# Patient Record
Sex: MALE | Race: BLACK OR AFRICAN AMERICAN | Employment: OTHER
[De-identification: names, ages, dates, MRNs, and addresses within clinical notes are randomized per-mention and may not be internally consistent; named-entity substitution may affect disease eponyms.]

---

## 2022-03-20 PROBLEM — Z87.828 HISTORY OF CLOSED HIP DISLOCATION: Status: ACTIVE | Noted: 2020-11-23

## 2022-04-26 PROBLEM — G62.9 PERIPHERAL NEUROPATHY: Status: ACTIVE | Noted: 2020-10-23

## 2022-04-26 PROBLEM — I10 ESSENTIAL HYPERTENSION: Status: ACTIVE | Noted: 2020-10-23

## 2022-05-02 PROBLEM — I48.11 LONGSTANDING PERSISTENT ATRIAL FIBRILLATION (HCC): Status: ACTIVE | Noted: 2022-05-02

## 2022-12-07 PROBLEM — I50.21 ACUTE HFREF (HEART FAILURE WITH REDUCED EJECTION FRACTION) (HCC): Status: ACTIVE | Noted: 2022-12-07

## 2023-01-10 PROBLEM — N18.30 CHRONIC RENAL DISEASE, STAGE III (HCC): Status: ACTIVE | Noted: 2023-01-10

## 2023-01-27 PROBLEM — I48.19 PERSISTENT ATRIAL FIBRILLATION (HCC): Status: ACTIVE | Noted: 2023-01-27

## 2023-02-28 DIAGNOSIS — D50.9 IRON DEFICIENCY ANEMIA, UNSPECIFIED IRON DEFICIENCY ANEMIA TYPE: ICD-10-CM

## 2023-02-28 RX ORDER — CETIRIZINE HYDROCHLORIDE 10 MG/1
TABLET ORAL
Qty: 90 TABLET | Refills: 1 | OUTPATIENT
Start: 2023-02-28

## 2023-02-28 RX ORDER — IRON POLYSACCHARIDE COMPLEX 150 MG
CAPSULE ORAL
Qty: 90 CAPSULE | Refills: 0 | OUTPATIENT
Start: 2023-02-28

## 2023-03-24 DIAGNOSIS — D50.9 IRON DEFICIENCY ANEMIA, UNSPECIFIED IRON DEFICIENCY ANEMIA TYPE: ICD-10-CM

## 2023-03-24 DIAGNOSIS — L29.9 ITCHING: ICD-10-CM

## 2023-03-24 RX ORDER — HYDROXYZINE HYDROCHLORIDE 25 MG/1
TABLET, FILM COATED ORAL
Qty: 60 TABLET | Refills: 0 | OUTPATIENT
Start: 2023-03-24

## 2023-03-24 RX ORDER — IRON POLYSACCHARIDE COMPLEX 150 MG
CAPSULE ORAL
Qty: 90 CAPSULE | Refills: 0 | OUTPATIENT
Start: 2023-03-24

## 2023-03-27 ENCOUNTER — TELEPHONE (OUTPATIENT)
Dept: FAMILY MEDICINE CLINIC | Facility: CLINIC | Age: 63
End: 2023-03-27

## 2023-03-27 RX ORDER — GABAPENTIN 300 MG/1
300 CAPSULE ORAL DAILY
Qty: 30 CAPSULE | Refills: 0 | Status: SHIPPED | OUTPATIENT
Start: 2023-03-27 | End: 2023-04-26 | Stop reason: SDUPTHER

## 2023-04-25 NOTE — PROGRESS NOTES
Microalbumin / Creatinine Urine Ratio    Comprehensive Metabolic Panel    CBC with Auto Differential    Microalbumin / Creatinine Urine Ratio      2. Mixed hyperlipidemia  atorvastatin (LIPITOR) 20 MG tablet    CBC with Auto Differential    Comprehensive Metabolic Panel    Lipid Panel    Lipid Panel    Comprehensive Metabolic Panel    CBC with Auto Differential      3. Neuropathy  gabapentin (NEURONTIN) 300 MG capsule      4. Iron deficiency anemia, unspecified iron deficiency anemia type  Iron    iron polysaccharides (NIFEREX) 150 MG capsule    Iron      5. Screening for HIV (human immunodeficiency virus)  HIV 1/2 Ag/Ab, 4TH Generation,W Rflx Confirm    HIV 1/2 Ag/Ab, 4TH Generation,W Rflx Confirm        Checking blood work. If kidneys lower or not improved will send referral to Nephrologist.     Checking iron- refilled. Increased gabapentin from daily to BID for neuropathy-seeing podiatry. Anticipatory Guidance/Education:  Anticipatory guidance for age-seatbelts, avoid cell phone use in car (may use hands free), no texting while driving, avoid social drugs and tobacco, limit alcohol, fall safety, personal safety. Encourage healthy diet and exercise daily. Follow up 3 months.      GORAN Red - CNP

## 2023-04-26 ENCOUNTER — OFFICE VISIT (OUTPATIENT)
Dept: FAMILY MEDICINE CLINIC | Facility: CLINIC | Age: 63
End: 2023-04-26
Payer: MEDICARE

## 2023-04-26 VITALS
DIASTOLIC BLOOD PRESSURE: 82 MMHG | TEMPERATURE: 97.2 F | BODY MASS INDEX: 28.11 KG/M2 | WEIGHT: 219 LBS | HEART RATE: 67 BPM | RESPIRATION RATE: 16 BRPM | HEIGHT: 74 IN | SYSTOLIC BLOOD PRESSURE: 140 MMHG | OXYGEN SATURATION: 97 %

## 2023-04-26 DIAGNOSIS — Z11.4 SCREENING FOR HIV (HUMAN IMMUNODEFICIENCY VIRUS): ICD-10-CM

## 2023-04-26 DIAGNOSIS — E78.2 MIXED HYPERLIPIDEMIA: ICD-10-CM

## 2023-04-26 DIAGNOSIS — N28.9 FUNCTION KIDNEY DECREASED: Primary | ICD-10-CM

## 2023-04-26 DIAGNOSIS — G62.9 NEUROPATHY: ICD-10-CM

## 2023-04-26 DIAGNOSIS — D50.9 IRON DEFICIENCY ANEMIA, UNSPECIFIED IRON DEFICIENCY ANEMIA TYPE: ICD-10-CM

## 2023-04-26 LAB
BASOPHILS # BLD: 0.1 K/UL (ref 0–0.2)
BASOPHILS NFR BLD: 1 % (ref 0–2)
DIFFERENTIAL METHOD BLD: ABNORMAL
EOSINOPHIL # BLD: 0.3 K/UL (ref 0–0.8)
EOSINOPHIL NFR BLD: 4 % (ref 0.5–7.8)
ERYTHROCYTE [DISTWIDTH] IN BLOOD BY AUTOMATED COUNT: 13.5 % (ref 11.9–14.6)
HCT VFR BLD AUTO: 34.5 % (ref 41.1–50.3)
HGB BLD-MCNC: 11.3 G/DL (ref 13.6–17.2)
IMM GRANULOCYTES # BLD AUTO: 0 K/UL (ref 0–0.5)
IMM GRANULOCYTES NFR BLD AUTO: 1 % (ref 0–5)
LYMPHOCYTES # BLD: 1.6 K/UL (ref 0.5–4.6)
LYMPHOCYTES NFR BLD: 27 % (ref 13–44)
MCH RBC QN AUTO: 32.9 PG (ref 26.1–32.9)
MCHC RBC AUTO-ENTMCNC: 32.8 G/DL (ref 31.4–35)
MCV RBC AUTO: 100.6 FL (ref 82–102)
MONOCYTES # BLD: 0.4 K/UL (ref 0.1–1.3)
MONOCYTES NFR BLD: 6 % (ref 4–12)
NEUTS SEG # BLD: 3.7 K/UL (ref 1.7–8.2)
NEUTS SEG NFR BLD: 61 % (ref 43–78)
NRBC # BLD: 0 K/UL (ref 0–0.2)
PLATELET # BLD AUTO: 370 K/UL (ref 150–450)
PMV BLD AUTO: 10.1 FL (ref 9.4–12.3)
RBC # BLD AUTO: 3.43 M/UL (ref 4.23–5.6)
WBC # BLD AUTO: 6 K/UL (ref 4.3–11.1)

## 2023-04-26 PROCEDURE — 3017F COLORECTAL CA SCREEN DOC REV: CPT | Performed by: NURSE PRACTITIONER

## 2023-04-26 PROCEDURE — 3077F SYST BP >= 140 MM HG: CPT | Performed by: NURSE PRACTITIONER

## 2023-04-26 PROCEDURE — G8419 CALC BMI OUT NRM PARAM NOF/U: HCPCS | Performed by: NURSE PRACTITIONER

## 2023-04-26 PROCEDURE — 4004F PT TOBACCO SCREEN RCVD TLK: CPT | Performed by: NURSE PRACTITIONER

## 2023-04-26 PROCEDURE — G8427 DOCREV CUR MEDS BY ELIG CLIN: HCPCS | Performed by: NURSE PRACTITIONER

## 2023-04-26 PROCEDURE — 3079F DIAST BP 80-89 MM HG: CPT | Performed by: NURSE PRACTITIONER

## 2023-04-26 PROCEDURE — 99214 OFFICE O/P EST MOD 30 MIN: CPT | Performed by: NURSE PRACTITIONER

## 2023-04-26 RX ORDER — ATORVASTATIN CALCIUM 20 MG/1
20 TABLET, FILM COATED ORAL DAILY
Qty: 90 TABLET | Refills: 1 | Status: SHIPPED | OUTPATIENT
Start: 2023-04-26

## 2023-04-26 RX ORDER — KETOCONAZOLE 20 MG/G
CREAM TOPICAL DAILY
COMMUNITY

## 2023-04-26 RX ORDER — IRON POLYSACCHARIDE COMPLEX 150 MG
150 CAPSULE ORAL DAILY
Qty: 90 CAPSULE | Refills: 0 | Status: SHIPPED | OUTPATIENT
Start: 2023-04-26 | End: 2023-07-25

## 2023-04-26 RX ORDER — TERBINAFINE HYDROCHLORIDE 250 MG/1
250 TABLET ORAL DAILY
COMMUNITY

## 2023-04-26 RX ORDER — GABAPENTIN 300 MG/1
300 CAPSULE ORAL 2 TIMES DAILY
Qty: 180 CAPSULE | Refills: 1 | Status: SHIPPED | OUTPATIENT
Start: 2023-04-26 | End: 2023-10-23

## 2023-04-26 ASSESSMENT — PATIENT HEALTH QUESTIONNAIRE - PHQ9
2. FEELING DOWN, DEPRESSED OR HOPELESS: 0
SUM OF ALL RESPONSES TO PHQ QUESTIONS 1-9: 0
1. LITTLE INTEREST OR PLEASURE IN DOING THINGS: 0
SUM OF ALL RESPONSES TO PHQ9 QUESTIONS 1 & 2: 0
SUM OF ALL RESPONSES TO PHQ QUESTIONS 1-9: 0

## 2023-04-27 LAB
ALBUMIN SERPL-MCNC: 3.4 G/DL (ref 3.2–4.6)
ALBUMIN/GLOB SERPL: 0.7 (ref 0.4–1.6)
ALP SERPL-CCNC: 69 U/L (ref 50–136)
ALT SERPL-CCNC: 14 U/L (ref 12–65)
ANION GAP SERPL CALC-SCNC: 5 MMOL/L (ref 2–11)
AST SERPL-CCNC: 23 U/L (ref 15–37)
BILIRUB SERPL-MCNC: 0.3 MG/DL (ref 0.2–1.1)
BUN SERPL-MCNC: 17 MG/DL (ref 8–23)
CALCIUM SERPL-MCNC: 9 MG/DL (ref 8.3–10.4)
CHLORIDE SERPL-SCNC: 113 MMOL/L (ref 101–110)
CHOLEST SERPL-MCNC: 212 MG/DL
CO2 SERPL-SCNC: 24 MMOL/L (ref 21–32)
CREAT SERPL-MCNC: 1.4 MG/DL (ref 0.8–1.5)
CREAT UR-MCNC: 162 MG/DL
GLOBULIN SER CALC-MCNC: 4.7 G/DL (ref 2.8–4.5)
GLUCOSE SERPL-MCNC: 86 MG/DL (ref 65–100)
HDLC SERPL-MCNC: 60 MG/DL (ref 40–60)
HDLC SERPL: 3.5
HIV 1+2 AB+HIV1 P24 AG SERPL QL IA: NONREACTIVE
HIV 1/2 RESULT COMMENT: NORMAL
IRON SERPL-MCNC: 92 UG/DL (ref 35–150)
LDLC SERPL CALC-MCNC: 124.2 MG/DL
MICROALBUMIN UR-MCNC: 1.86 MG/DL (ref 0–3)
MICROALBUMIN/CREAT UR-RTO: 11 MG/G (ref 0–30)
POTASSIUM SERPL-SCNC: 3.8 MMOL/L (ref 3.5–5.1)
PROT SERPL-MCNC: 8.1 G/DL (ref 6.3–8.2)
SODIUM SERPL-SCNC: 142 MMOL/L (ref 133–143)
TRIGL SERPL-MCNC: 139 MG/DL (ref 35–150)
VLDLC SERPL CALC-MCNC: 27.8 MG/DL (ref 6–23)

## 2023-07-01 DIAGNOSIS — L29.9 ITCHING: ICD-10-CM

## 2023-07-03 RX ORDER — HYDROXYZINE HYDROCHLORIDE 25 MG/1
TABLET, FILM COATED ORAL
Qty: 180 TABLET | Refills: 0 | OUTPATIENT
Start: 2023-07-03

## 2023-07-04 DIAGNOSIS — D50.9 IRON DEFICIENCY ANEMIA, UNSPECIFIED IRON DEFICIENCY ANEMIA TYPE: ICD-10-CM

## 2023-07-05 RX ORDER — IRON POLYSACCHARIDE COMPLEX 150 MG
CAPSULE ORAL
Qty: 90 CAPSULE | Refills: 0 | OUTPATIENT
Start: 2023-07-05

## 2023-07-25 NOTE — PROGRESS NOTES
James Adan (: 1960) presents today for follow up. He currently only sees PCP, dermatology. He has past history of high cholesterol, high blood pressure, atrial fibrillation. Blood work checked last 2023 showing all normal except for elevated cholesterol, decrease kidney function, abnormal CBC. Cholesterol   Total chol 212, . On baby aspirin, 20 mg lipitor. Kidneys  Kidneys- GFR 56- was 39. Has not seen nephrology in the past.     Iron  Thinks someone in the hospital originally prescribed him this. hemoglobin trend 11.3-12-12.1-10.4. GERD- on 20 mg nexium- controls symptoms. Hypertension-   On diovan 80 mg, metoprolol 200 mg XL. When he checks his bp it is always around 140/90- states it is never above this. He is on xarelto for atrial fibrillation. He has not seen cardiology since 2023- appears he had an apt but missed this. Was seeing urology for follow up on elevated psa- is not seeing them anymore. Stopped going to [facility] center- were not helping his pain. Was going there for neuropathy. On gabapentin for neuropathy. Did see a specialist for his feet and skin concern- was given cream and has helped some. He is also on terbinafine pill right now as well.  Seeing dermatologist.        Chief Complaint   Patient presents with    Follow-up     Follow up BP and cholesterol     Patient Active Problem List   Diagnosis    History of bilateral hip replacements    History of closed hip dislocation    Essential hypertension    Mixed hyperlipidemia    Peripheral neuropathy    Tobacco use    Longstanding persistent atrial fibrillation (HCC)    Iatrogenic hypotension    Acute HFrEF (heart failure with reduced ejection fraction) (HCC)    Chronic renal disease, stage III ([address]) [146709]    Persistent atrial fibrillation ([address])     Past Medical History:   Diagnosis Date    Arthritis     Avascular necrosis

## 2023-07-26 ENCOUNTER — OFFICE VISIT (OUTPATIENT)
Dept: FAMILY MEDICINE CLINIC | Facility: CLINIC | Age: 63
End: 2023-07-26
Payer: MEDICARE

## 2023-07-26 VITALS
SYSTOLIC BLOOD PRESSURE: 140 MMHG | RESPIRATION RATE: 16 BRPM | DIASTOLIC BLOOD PRESSURE: 90 MMHG | WEIGHT: 217 LBS | HEART RATE: 57 BPM | OXYGEN SATURATION: 97 % | BODY MASS INDEX: 27.85 KG/M2 | HEIGHT: 74 IN | TEMPERATURE: 97.2 F

## 2023-07-26 DIAGNOSIS — D50.9 IRON DEFICIENCY ANEMIA, UNSPECIFIED IRON DEFICIENCY ANEMIA TYPE: ICD-10-CM

## 2023-07-26 DIAGNOSIS — N28.9 FUNCTION KIDNEY DECREASED: ICD-10-CM

## 2023-07-26 DIAGNOSIS — E78.2 MIXED HYPERLIPIDEMIA: Primary | ICD-10-CM

## 2023-07-26 LAB
ALBUMIN SERPL-MCNC: 3 G/DL (ref 3.2–4.6)
ALBUMIN/GLOB SERPL: 0.6 (ref 0.4–1.6)
ALP SERPL-CCNC: 70 U/L (ref 50–136)
ALT SERPL-CCNC: 16 U/L (ref 12–65)
ANION GAP SERPL CALC-SCNC: 5 MMOL/L (ref 2–11)
AST SERPL-CCNC: 19 U/L (ref 15–37)
BASOPHILS # BLD: 0.1 K/UL (ref 0–0.2)
BASOPHILS NFR BLD: 1 % (ref 0–2)
BILIRUB SERPL-MCNC: 0.4 MG/DL (ref 0.2–1.1)
BUN SERPL-MCNC: 15 MG/DL (ref 8–23)
CALCIUM SERPL-MCNC: 9.1 MG/DL (ref 8.3–10.4)
CHLORIDE SERPL-SCNC: 109 MMOL/L (ref 101–110)
CHOLEST SERPL-MCNC: 124 MG/DL
CO2 SERPL-SCNC: 28 MMOL/L (ref 21–32)
CREAT SERPL-MCNC: 1.6 MG/DL (ref 0.8–1.5)
DIFFERENTIAL METHOD BLD: ABNORMAL
EOSINOPHIL # BLD: 0.2 K/UL (ref 0–0.8)
EOSINOPHIL NFR BLD: 4 % (ref 0.5–7.8)
ERYTHROCYTE [DISTWIDTH] IN BLOOD BY AUTOMATED COUNT: 13.8 % (ref 11.9–14.6)
GLOBULIN SER CALC-MCNC: 4.9 G/DL (ref 2.8–4.5)
GLUCOSE SERPL-MCNC: 101 MG/DL (ref 65–100)
HCT VFR BLD AUTO: 35.8 % (ref 41.1–50.3)
HDLC SERPL-MCNC: 54 MG/DL (ref 40–60)
HDLC SERPL: 2.3
HGB BLD-MCNC: 11.3 G/DL (ref 13.6–17.2)
IMM GRANULOCYTES # BLD AUTO: 0 K/UL (ref 0–0.5)
IMM GRANULOCYTES NFR BLD AUTO: 0 % (ref 0–5)
LDLC SERPL CALC-MCNC: 52.4 MG/DL
LYMPHOCYTES # BLD: 1.4 K/UL (ref 0.5–4.6)
LYMPHOCYTES NFR BLD: 23 % (ref 13–44)
MCH RBC QN AUTO: 31 PG (ref 26.1–32.9)
MCHC RBC AUTO-ENTMCNC: 31.6 G/DL (ref 31.4–35)
MCV RBC AUTO: 98.1 FL (ref 82–102)
MONOCYTES # BLD: 0.6 K/UL (ref 0.1–1.3)
MONOCYTES NFR BLD: 10 % (ref 4–12)
NEUTS SEG # BLD: 3.8 K/UL (ref 1.7–8.2)
NEUTS SEG NFR BLD: 62 % (ref 43–78)
NRBC # BLD: 0 K/UL (ref 0–0.2)
PLATELET # BLD AUTO: 271 K/UL (ref 150–450)
PMV BLD AUTO: 10.3 FL (ref 9.4–12.3)
POTASSIUM SERPL-SCNC: 3.8 MMOL/L (ref 3.5–5.1)
PROT SERPL-MCNC: 7.9 G/DL (ref 6.3–8.2)
RBC # BLD AUTO: 3.65 M/UL (ref 4.23–5.6)
SODIUM SERPL-SCNC: 142 MMOL/L (ref 133–143)
TRIGL SERPL-MCNC: 88 MG/DL (ref 35–150)
VLDLC SERPL CALC-MCNC: 17.6 MG/DL (ref 6–23)
WBC # BLD AUTO: 6.1 K/UL (ref 4.3–11.1)

## 2023-07-26 PROCEDURE — 3080F DIAST BP >= 90 MM HG: CPT | Performed by: NURSE PRACTITIONER

## 2023-07-26 PROCEDURE — 99213 OFFICE O/P EST LOW 20 MIN: CPT | Performed by: NURSE PRACTITIONER

## 2023-07-26 PROCEDURE — 3017F COLORECTAL CA SCREEN DOC REV: CPT | Performed by: NURSE PRACTITIONER

## 2023-07-26 PROCEDURE — G8419 CALC BMI OUT NRM PARAM NOF/U: HCPCS | Performed by: NURSE PRACTITIONER

## 2023-07-26 PROCEDURE — 4004F PT TOBACCO SCREEN RCVD TLK: CPT | Performed by: NURSE PRACTITIONER

## 2023-07-26 PROCEDURE — G8427 DOCREV CUR MEDS BY ELIG CLIN: HCPCS | Performed by: NURSE PRACTITIONER

## 2023-07-26 PROCEDURE — 3077F SYST BP >= 140 MM HG: CPT | Performed by: NURSE PRACTITIONER

## 2023-07-26 SDOH — ECONOMIC STABILITY: FOOD INSECURITY: WITHIN THE PAST 12 MONTHS, YOU WORRIED THAT YOUR FOOD WOULD RUN OUT BEFORE YOU GOT MONEY TO BUY MORE.: NEVER TRUE

## 2023-07-26 SDOH — ECONOMIC STABILITY: INCOME INSECURITY: HOW HARD IS IT FOR YOU TO PAY FOR THE VERY BASICS LIKE FOOD, HOUSING, MEDICAL CARE, AND HEATING?: NOT HARD AT ALL

## 2023-07-26 SDOH — ECONOMIC STABILITY: FOOD INSECURITY: WITHIN THE PAST 12 MONTHS, THE FOOD YOU BOUGHT JUST DIDN'T LAST AND YOU DIDN'T HAVE MONEY TO GET MORE.: NEVER TRUE

## 2023-07-26 SDOH — ECONOMIC STABILITY: HOUSING INSECURITY
IN THE LAST 12 MONTHS, WAS THERE A TIME WHEN YOU DID NOT HAVE A STEADY PLACE TO SLEEP OR SLEPT IN A SHELTER (INCLUDING NOW)?: NO

## 2023-07-26 ASSESSMENT — PATIENT HEALTH QUESTIONNAIRE - PHQ9
SUM OF ALL RESPONSES TO PHQ QUESTIONS 1-9: 0
SUM OF ALL RESPONSES TO PHQ9 QUESTIONS 1 & 2: 0
2. FEELING DOWN, DEPRESSED OR HOPELESS: 0
1. LITTLE INTEREST OR PLEASURE IN DOING THINGS: 0
SUM OF ALL RESPONSES TO PHQ QUESTIONS 1-9: 0

## 2023-07-28 DIAGNOSIS — D50.9 IRON DEFICIENCY ANEMIA, UNSPECIFIED IRON DEFICIENCY ANEMIA TYPE: ICD-10-CM

## 2023-07-31 RX ORDER — IRON POLYSACCHARIDE COMPLEX 150 MG
CAPSULE ORAL
Qty: 90 CAPSULE | Refills: 0 | OUTPATIENT
Start: 2023-07-31

## 2023-08-14 RX ORDER — RIVAROXABAN 20 MG/1
TABLET, FILM COATED ORAL
Qty: 30 TABLET | Refills: 5 | Status: SHIPPED | OUTPATIENT
Start: 2023-08-14

## 2023-08-24 NOTE — PROGRESS NOTES
Medicare Annual Wellness Visit    Elenita Fernandez is here for Medicare AWV    Assessment & Plan   Medicare annual wellness visit, subsequent  -     CT Lung Screening; Future  -     [facility] - Referral to [facility] Clinical Specialist  Screen for colon cancer  -     [facility] - Colonoscopy  Need for vaccination  -     Tdap (ADACEL) 5-2-15.5 LF-MCG/0.5 injection; Inject 0.5 mLs into the muscle once for 1 dose, Disp-0.5 mL, R-0Normal  Pre-diabetes  -     Hemoglobin A1C; Future  Elevated PSA  -     PSA, Total and Free; Future  Unknown varicella vaccination status  -     Varicella Zoster Antibody, IgG; Future  Personal history of tobacco use  -     CT Lung Screening; Future  ACP (advance care planning)  -     [facility] - Referral to [facility] [address] Clinical Specialist  Mixed hyperlipidemia  -     atorvastatin (LIPITOR) 20 MG tablet; Take 1 tablet by mouth daily, Disp-90 tablet, R-1Normal    Ordered colonoscopy, CT lung screen, blood work, ACP. See sheet scanned into epic. Recommendations for Preventive Services Due: see orders and patient instructions/AVS.  Recommended screening schedule for the next 5-10 years is provided to the patient in written form: see Patient Instructions/AVS.          Subjective   For AMW. He has past history of high cholesterol, high blood pressure, atrial fibrillation. Blood work checked last 7/26 showing all normal except for kidneys, CBC.      GFR 48- was 56. CBC- continue iron. Hgb 11-11-12. Cholesterol normal.      Been over 10 years since last tdap- ordering today. Thinks 15 years he had a colonoscopy at [facility]- went up to [facility]- went there- gave him a numbing medication- used machine at that time. Wanted him to come back again- this was during pandemic- wanted to wait during that time. Will order today. Has never had lung cancer screening- history of tobacco use. Would like this. Not seeing urology for elevated psa.  Does not think he has seen one in the past. Does not

## 2023-08-25 ENCOUNTER — OFFICE VISIT (OUTPATIENT)
Dept: FAMILY MEDICINE CLINIC | Facility: CLINIC | Age: 63
End: 2023-08-25

## 2023-08-25 VITALS
SYSTOLIC BLOOD PRESSURE: 140 MMHG | BODY MASS INDEX: 29.26 KG/M2 | HEIGHT: 74 IN | RESPIRATION RATE: 16 BRPM | DIASTOLIC BLOOD PRESSURE: 90 MMHG | OXYGEN SATURATION: 97 % | WEIGHT: 228 LBS | TEMPERATURE: 97.3 F | HEART RATE: 57 BPM

## 2023-08-25 DIAGNOSIS — Z00.00 MEDICARE ANNUAL WELLNESS VISIT, SUBSEQUENT: Primary | ICD-10-CM

## 2023-08-25 DIAGNOSIS — Z71.89 ACP (ADVANCE CARE PLANNING): ICD-10-CM

## 2023-08-25 DIAGNOSIS — Z78.9 UNKNOWN VARICELLA VACCINATION STATUS: ICD-10-CM

## 2023-08-25 DIAGNOSIS — E78.2 MIXED HYPERLIPIDEMIA: ICD-10-CM

## 2023-08-25 DIAGNOSIS — R73.03 PRE-DIABETES: ICD-10-CM

## 2023-08-25 DIAGNOSIS — Z87.891 PERSONAL HISTORY OF TOBACCO USE: ICD-10-CM

## 2023-08-25 DIAGNOSIS — R97.20 ELEVATED PSA: ICD-10-CM

## 2023-08-25 DIAGNOSIS — Z12.11 SCREEN FOR COLON CANCER: ICD-10-CM

## 2023-08-25 DIAGNOSIS — Z23 NEED FOR VACCINATION: ICD-10-CM

## 2023-08-25 RX ORDER — ATORVASTATIN CALCIUM 20 MG/1
20 TABLET, FILM COATED ORAL DAILY
Qty: 90 TABLET | Refills: 1 | Status: SHIPPED | OUTPATIENT
Start: 2023-08-25

## 2023-08-25 SDOH — ECONOMIC STABILITY: FOOD INSECURITY: WITHIN THE PAST 12 MONTHS, THE FOOD YOU BOUGHT JUST DIDN'T LAST AND YOU DIDN'T HAVE MONEY TO GET MORE.: NEVER TRUE

## 2023-08-25 SDOH — ECONOMIC STABILITY: INCOME INSECURITY: HOW HARD IS IT FOR YOU TO PAY FOR THE VERY BASICS LIKE FOOD, HOUSING, MEDICAL CARE, AND HEATING?: NOT HARD AT ALL

## 2023-08-25 SDOH — ECONOMIC STABILITY: FOOD INSECURITY: WITHIN THE PAST 12 MONTHS, YOU WORRIED THAT YOUR FOOD WOULD RUN OUT BEFORE YOU GOT MONEY TO BUY MORE.: NEVER TRUE

## 2023-08-25 ASSESSMENT — PATIENT HEALTH QUESTIONNAIRE - PHQ9
1. LITTLE INTEREST OR PLEASURE IN DOING THINGS: 0
SUM OF ALL RESPONSES TO PHQ QUESTIONS 1-9: 0
SUM OF ALL RESPONSES TO PHQ9 QUESTIONS 1 & 2: 0
SUM OF ALL RESPONSES TO PHQ QUESTIONS 1-9: 0
2. FEELING DOWN, DEPRESSED OR HOPELESS: 0

## 2023-08-26 LAB
EST. AVERAGE GLUCOSE BLD GHB EST-MCNC: 114 MG/DL
HBA1C MFR BLD: 5.6 % (ref 4.8–5.6)
PSA FREE MFR SERPL: 40 %
PSA FREE SERPL-MCNC: 1 NG/ML
PSA SERPL-MCNC: 2.5 NG/ML

## 2023-08-27 LAB — VZV IGG SER IA-ACNC: 1164 INDEX

## 2023-08-28 ENCOUNTER — CLINICAL DOCUMENTATION (OUTPATIENT)
Dept: CASE MANAGEMENT | Age: 63
End: 2023-08-28

## 2023-08-28 NOTE — ACP (ADVANCE CARE PLANNING)
Advance Care Planning   Ambulatory ACP Specialist Patient Outreach    Date:  8/28/2023; 8/30/2023    ACP Specialist:  PHAN Cano    Outreach call to patient in follow-up to ACP Specialist referral from:GORAN Coker CNP    [x] PCP  [] Provider   [] Ambulatory Care Management [] Other     For:                  [x] Advance Directive Assistance              [] Complete Portable DNR order              [] Complete POST/POLST/MOST              [] Code Status Discussion             [] Discuss Goals of Care             [] Early ACP Decision-Making              [] Other (Specify)    Date Referral Received:8/25/2023    Next Step:   [] ACP scheduled conversation  [x] Close if don't hear in two weeks            [x] Email / Mail [address]  [x] Email / Mail Advance Directive   [] Closing referral.  Routing closure to referring provider/staff and to ACP Specialist . [] Closure letter mailed to patient with invitation to contact ACP Specialist if / when ready. [] Other (Specify here):         [x] At this time, Healthcare Decision Maker Is:  Advance Care Planning   Healthcare Decision Maker:    Primary Decision Maker: Winsome Mc - Child - [phone]        [] Primary agent named in scanned advance directive. [x] Legal Next of Kin. [] Unable to determine legal decision maker at this time. Outreaches:       [] 1st -  Date:  8/28/2023               Intervention:  [] Spoke with Patient   [x] Left Voice mail [] Email / Mail    [] Neult  [] Other [id number] : Outcomes:SW called to try and touch base with pt due to recent ACP referral.  However, SW got pt's voice mail and had to leave a message.    SW will attempt to follow up with pt again in a week if SW doesn't hear from pt before then.             [] 2nd -  Date:  8/30/2023               Intervention:  [x] Spoke with Patient  [] Left Voice mail [] Email / Mail    [] Neult  [] Other [id number] :

## 2023-08-30 ENCOUNTER — CLINICAL DOCUMENTATION (OUTPATIENT)
Dept: CASE MANAGEMENT | Age: 63
End: 2023-08-30

## 2023-09-06 ENCOUNTER — HOSPITAL ENCOUNTER (OUTPATIENT)
Dept: CT IMAGING | Age: 63
Discharge: HOME | End: 2023-09-08
Payer: MEDICARE

## 2023-09-06 DIAGNOSIS — Z00.00 MEDICARE ANNUAL WELLNESS VISIT, SUBSEQUENT: ICD-10-CM

## 2023-09-06 DIAGNOSIS — Z87.891 PERSONAL HISTORY OF TOBACCO USE: ICD-10-CM

## 2023-09-06 PROCEDURE — 71271 CT THORAX LUNG CANCER SCR C-: CPT

## 2023-09-14 ENCOUNTER — CLINICAL DOCUMENTATION (OUTPATIENT)
Dept: CASE MANAGEMENT | Age: 63
End: 2023-09-14

## 2023-09-29 NOTE — PROGRESS NOTES
[facility] CARDIOLOGY  [address] [facility] [address] [facility]  PHONE: [phone]      10/02/23    NAME:  Didi Alexandra  : 1960  MRN: [id number]         SUBJECTIVE:   Didi Alexandra is a 58 y.o. male seen for a follow up visit regarding the following:     Chief Complaint   Patient presents with    Follow-up    Atrial Fibrillation    Congestive Heart Failure            HPI:  Follow up  Follow-up, Atrial Fibrillation, and Congestive Heart Failure   . Follow up afib, tobacco use,  intermittent compliance, HFrEF, possibly tachy mediated. He was last seen in January and arranged EMILY guided DCCV with outpatient EP evaluation, didn't show for office follow up or EP Consultation, EF had recovered , though on personal review of images I'm not sure I fully agree. Clearly better than it was but not normal.        He feels much better, much less short of breath, no chest pain and no edema. Still smoking 1/2 ppd, says he wants to quit, notes he smokes mostly out of a feeling of boredom. PAST CARDIAC HISTORY:  Oct 2020- Echo [facility]) - EF 55-65%, impaired relaxation, aortic root 3.9, no significant valve disease, RVSP 19  May 2022- NST - fixed inferior defect, no ischemia. Not gated d/t afib  Echo - no show  2022       Echo - EF 35-40%, global HK, mild RVE, RVSP 46 SOTERO       Ambulatory monitor-persistent RVR, rates to 180  Dec 2022       LHC - minimal CAD, no V-gram  2023       EMILY - EF 50-55%, successful cardioversion                     Key CAD CHF Meds            atorvastatin (LIPITOR) 20 MG tablet (Taking)    Sig - Route: Take 1 tablet by mouth daily - Oral    XARELTO 20 MG TABS tablet (Taking)    Sig: TAKE 1 TABLET BY MOUTH EVERY DAY WITH DINNER    valsartan (DIOVAN) 80 MG tablet (Taking)    Sig - Route: Take 1 tablet by mouth daily - Oral    metoprolol succinate (TOPROL XL) 200 MG extended release tablet (Taking)    Sig - Route:  Take 1 tablet by mouth daily - Oral

## 2023-10-02 ENCOUNTER — OFFICE VISIT (OUTPATIENT)
Age: 63
End: 2023-10-02
Payer: MEDICARE

## 2023-10-02 VITALS
WEIGHT: 218.8 LBS | BODY MASS INDEX: 28.08 KG/M2 | HEART RATE: 60 BPM | SYSTOLIC BLOOD PRESSURE: 132 MMHG | HEIGHT: 74 IN | DIASTOLIC BLOOD PRESSURE: 86 MMHG

## 2023-10-02 DIAGNOSIS — I48.0 PAROXYSMAL ATRIAL FIBRILLATION (HCC): ICD-10-CM

## 2023-10-02 DIAGNOSIS — E78.2 MIXED HYPERLIPIDEMIA: ICD-10-CM

## 2023-10-02 DIAGNOSIS — R00.0 TACHYCARDIA INDUCED CARDIOMYOPATHY (HCC): Primary | ICD-10-CM

## 2023-10-02 DIAGNOSIS — I10 ESSENTIAL HYPERTENSION: ICD-10-CM

## 2023-10-02 DIAGNOSIS — N18.32 STAGE 3B CHRONIC KIDNEY DISEASE (HCC): ICD-10-CM

## 2023-10-02 DIAGNOSIS — I43 TACHYCARDIA INDUCED CARDIOMYOPATHY (HCC): Primary | ICD-10-CM

## 2023-10-02 PROCEDURE — 3075F SYST BP GE 130 - 139MM HG: CPT | Performed by: INTERNAL MEDICINE

## 2023-10-02 PROCEDURE — 93000 ELECTROCARDIOGRAM COMPLETE: CPT | Performed by: INTERNAL MEDICINE

## 2023-10-02 PROCEDURE — 99214 OFFICE O/P EST MOD 30 MIN: CPT | Performed by: INTERNAL MEDICINE

## 2023-10-02 PROCEDURE — 3079F DIAST BP 80-89 MM HG: CPT | Performed by: INTERNAL MEDICINE

## 2023-10-02 ASSESSMENT — ENCOUNTER SYMPTOMS: SHORTNESS OF BREATH: 1

## 2023-10-31 ENCOUNTER — OFFICE VISIT (OUTPATIENT)
Dept: FAMILY MEDICINE CLINIC | Facility: CLINIC | Age: 63
End: 2023-10-31
Payer: MEDICARE

## 2023-10-31 VITALS
OXYGEN SATURATION: 98 % | RESPIRATION RATE: 16 BRPM | HEIGHT: 74 IN | WEIGHT: 227 LBS | SYSTOLIC BLOOD PRESSURE: 170 MMHG | TEMPERATURE: 97.3 F | DIASTOLIC BLOOD PRESSURE: 98 MMHG | BODY MASS INDEX: 29.13 KG/M2 | HEART RATE: 61 BPM

## 2023-10-31 DIAGNOSIS — Z23 NEED FOR VACCINATION: ICD-10-CM

## 2023-10-31 DIAGNOSIS — G47.00 INSOMNIA, UNSPECIFIED TYPE: ICD-10-CM

## 2023-10-31 DIAGNOSIS — J30.1 ALLERGIC RHINITIS DUE TO POLLEN, UNSPECIFIED SEASONALITY: ICD-10-CM

## 2023-10-31 DIAGNOSIS — E78.2 MIXED HYPERLIPIDEMIA: Primary | ICD-10-CM

## 2023-10-31 DIAGNOSIS — N28.9 FUNCTION KIDNEY DECREASED: ICD-10-CM

## 2023-10-31 LAB
ALBUMIN SERPL-MCNC: 2.9 G/DL (ref 3.2–4.6)
ALBUMIN/GLOB SERPL: 0.8 (ref 0.4–1.6)
ALP SERPL-CCNC: 69 U/L (ref 50–136)
ALT SERPL-CCNC: 17 U/L (ref 12–65)
ANION GAP SERPL CALC-SCNC: 5 MMOL/L (ref 2–11)
AST SERPL-CCNC: 21 U/L (ref 15–37)
BASOPHILS # BLD: 0.1 K/UL (ref 0–0.2)
BASOPHILS NFR BLD: 1 % (ref 0–2)
BILIRUB SERPL-MCNC: 0.1 MG/DL (ref 0.2–1.1)
BUN SERPL-MCNC: 20 MG/DL (ref 8–23)
CALCIUM SERPL-MCNC: 8.7 MG/DL (ref 8.3–10.4)
CHLORIDE SERPL-SCNC: 112 MMOL/L (ref 101–110)
CHOLEST SERPL-MCNC: 125 MG/DL
CO2 SERPL-SCNC: 26 MMOL/L (ref 21–32)
CREAT SERPL-MCNC: 1.5 MG/DL (ref 0.8–1.5)
DIFFERENTIAL METHOD BLD: ABNORMAL
EOSINOPHIL # BLD: 0.2 K/UL (ref 0–0.8)
EOSINOPHIL NFR BLD: 4 % (ref 0.5–7.8)
ERYTHROCYTE [DISTWIDTH] IN BLOOD BY AUTOMATED COUNT: 14.1 % (ref 11.9–14.6)
GLOBULIN SER CALC-MCNC: 3.8 G/DL (ref 2.8–4.5)
GLUCOSE SERPL-MCNC: 96 MG/DL (ref 65–100)
HCT VFR BLD AUTO: 41.9 % (ref 41.1–50.3)
HDLC SERPL-MCNC: 43 MG/DL (ref 40–60)
HDLC SERPL: 2.9
HGB BLD-MCNC: 13.4 G/DL (ref 13.6–17.2)
IMM GRANULOCYTES # BLD AUTO: 0 K/UL (ref 0–0.5)
IMM GRANULOCYTES NFR BLD AUTO: 0 % (ref 0–5)
LDLC SERPL CALC-MCNC: 63.2 MG/DL
LYMPHOCYTES # BLD: 1.7 K/UL (ref 0.5–4.6)
LYMPHOCYTES NFR BLD: 28 % (ref 13–44)
MCH RBC QN AUTO: 32.1 PG (ref 26.1–32.9)
MCHC RBC AUTO-ENTMCNC: 32 G/DL (ref 31.4–35)
MCV RBC AUTO: 100.2 FL (ref 82–102)
MONOCYTES # BLD: 0.6 K/UL (ref 0.1–1.3)
MONOCYTES NFR BLD: 10 % (ref 4–12)
NEUTS SEG # BLD: 3.4 K/UL (ref 1.7–8.2)
NEUTS SEG NFR BLD: 57 % (ref 43–78)
NRBC # BLD: 0 K/UL (ref 0–0.2)
PLATELET # BLD AUTO: 172 K/UL (ref 150–450)
PMV BLD AUTO: 11.1 FL (ref 9.4–12.3)
POTASSIUM SERPL-SCNC: 4.2 MMOL/L (ref 3.5–5.1)
PROT SERPL-MCNC: 6.7 G/DL (ref 6.3–8.2)
RBC # BLD AUTO: 4.18 M/UL (ref 4.23–5.6)
SODIUM SERPL-SCNC: 143 MMOL/L (ref 133–143)
TRIGL SERPL-MCNC: 94 MG/DL (ref 35–150)
VLDLC SERPL CALC-MCNC: 18.8 MG/DL (ref 6–23)
WBC # BLD AUTO: 5.9 K/UL (ref 4.3–11.1)

## 2023-10-31 PROCEDURE — 3077F SYST BP >= 140 MM HG: CPT | Performed by: NURSE PRACTITIONER

## 2023-10-31 PROCEDURE — 3080F DIAST BP >= 90 MM HG: CPT | Performed by: NURSE PRACTITIONER

## 2023-10-31 PROCEDURE — 99214 OFFICE O/P EST MOD 30 MIN: CPT | Performed by: NURSE PRACTITIONER

## 2023-10-31 PROCEDURE — 90674 CCIIV4 VAC NO PRSV 0.5 ML IM: CPT | Performed by: NURSE PRACTITIONER

## 2023-10-31 PROCEDURE — G0008 ADMIN INFLUENZA VIRUS VAC: HCPCS | Performed by: NURSE PRACTITIONER

## 2023-10-31 RX ORDER — MONTELUKAST SODIUM 10 MG/1
10 TABLET ORAL DAILY
Qty: 30 TABLET | Refills: 3 | Status: SHIPPED | OUTPATIENT
Start: 2023-10-31

## 2023-10-31 RX ORDER — TRAZODONE HYDROCHLORIDE 50 MG/1
50 TABLET ORAL NIGHTLY
Qty: 90 TABLET | Refills: 1 | Status: SHIPPED | OUTPATIENT
Start: 2023-10-31

## 2023-10-31 RX ORDER — FLUTICASONE PROPIONATE 50 MCG
2 SPRAY, SUSPENSION (ML) NASAL DAILY
Qty: 48 G | Refills: 1 | Status: SHIPPED | OUTPATIENT
Start: 2023-10-31

## 2023-10-31 SDOH — ECONOMIC STABILITY: FOOD INSECURITY: WITHIN THE PAST 12 MONTHS, YOU WORRIED THAT YOUR FOOD WOULD RUN OUT BEFORE YOU GOT MONEY TO BUY MORE.: NEVER TRUE

## 2023-10-31 SDOH — ECONOMIC STABILITY: FOOD INSECURITY: WITHIN THE PAST 12 MONTHS, THE FOOD YOU BOUGHT JUST DIDN'T LAST AND YOU DIDN'T HAVE MONEY TO GET MORE.: NEVER TRUE

## 2023-10-31 SDOH — ECONOMIC STABILITY: INCOME INSECURITY: HOW HARD IS IT FOR YOU TO PAY FOR THE VERY BASICS LIKE FOOD, HOUSING, MEDICAL CARE, AND HEATING?: NOT HARD AT ALL

## 2023-10-31 ASSESSMENT — PATIENT HEALTH QUESTIONNAIRE - PHQ9
1. LITTLE INTEREST OR PLEASURE IN DOING THINGS: 0
SUM OF ALL RESPONSES TO PHQ QUESTIONS 1-9: 0
2. FEELING DOWN, DEPRESSED OR HOPELESS: 0
SUM OF ALL RESPONSES TO PHQ QUESTIONS 1-9: 0
SUM OF ALL RESPONSES TO PHQ QUESTIONS 1-9: 0
SUM OF ALL RESPONSES TO PHQ9 QUESTIONS 1 & 2: 0
SUM OF ALL RESPONSES TO PHQ QUESTIONS 1-9: 0

## 2023-11-13 RX ORDER — VALSARTAN 80 MG/1
80 TABLET ORAL DAILY
Qty: 90 TABLET | Refills: 3 | Status: SHIPPED | OUTPATIENT
Start: 2023-11-13

## 2023-11-25 ENCOUNTER — HOSPITAL ENCOUNTER (EMERGENCY)
Age: 63
Discharge: HOME | End: 2023-11-25
Attending: EMERGENCY MEDICINE
Payer: MEDICARE

## 2023-11-25 VITALS
SYSTOLIC BLOOD PRESSURE: 184 MMHG | DIASTOLIC BLOOD PRESSURE: 98 MMHG | HEART RATE: 62 BPM | RESPIRATION RATE: 17 BRPM | TEMPERATURE: 98 F | BODY MASS INDEX: 28.88 KG/M2 | WEIGHT: 225 LBS | OXYGEN SATURATION: 99 % | HEIGHT: 74 IN

## 2023-11-25 DIAGNOSIS — J34.0 NASAL ABSCESS: Primary | ICD-10-CM

## 2023-11-25 PROCEDURE — 99283 EMERGENCY DEPT VISIT LOW MDM: CPT

## 2023-11-25 RX ORDER — AMOXICILLIN AND CLAVULANATE POTASSIUM 875; 125 MG/1; MG/1
1 TABLET, FILM COATED ORAL 2 TIMES DAILY
Qty: 14 TABLET | Refills: 0 | Status: SHIPPED | OUTPATIENT
Start: 2023-11-25 | End: 2023-12-02

## 2023-11-25 ASSESSMENT — PAIN - FUNCTIONAL ASSESSMENT: PAIN_FUNCTIONAL_ASSESSMENT: 0-10

## 2023-11-25 ASSESSMENT — PAIN DESCRIPTION - LOCATION: LOCATION: NOSE

## 2023-11-25 ASSESSMENT — PAIN SCALES - GENERAL: PAINLEVEL_OUTOF10: 8

## 2023-11-25 NOTE — ED TRIAGE NOTES
Pt ambulatory to ED with c/o epistaxis that began this afternoon in right nare. Pt also c/o swelling to nose x2 days. Pt taking Xarelto. Bleeding controlled with tissue.

## 2023-11-25 NOTE — DISCHARGE INSTRUCTIONS
Apply mupirocin with a cotton tip swab to both nares, especially on the right at the site of the abscess, for 7 to 10 days. Take all antibiotic. Place a warm compresses in the nose at the site of abscess to encourage continued drainage. Return for worsening or concerning symptoms.

## 2023-11-27 ENCOUNTER — CARE COORDINATION (OUTPATIENT)
Dept: CARE COORDINATION | Facility: CLINIC | Age: 63
End: 2023-11-27

## 2023-11-27 NOTE — CARE COORDINATION
Ambulatory Care Management Note        Date/Time:  11/27/2023 1:04 PM    This patient was received as a referral from  Daily assignment for case management of recent er admission. Ccm outreached to patient. No answer at this time, message left. Awaiting response. If no response, [facility] will outreach tomorrow.

## 2023-11-28 ENCOUNTER — CARE COORDINATION (OUTPATIENT)
Dept: CARE COORDINATION | Facility: CLINIC | Age: 63
End: 2023-11-28

## 2023-11-28 NOTE — CARE COORDINATION
Ambulatory Care Management Note        Date/Time:  11/28/2023 10:51 AM    Ccm outreached to patient. No answer at this time,  message left. Awaiting response. If no response, ccm will outreach next week.

## 2023-12-05 ENCOUNTER — CARE COORDINATION (OUTPATIENT)
Dept: CARE COORDINATION | Facility: CLINIC | Age: 63
End: 2023-12-05

## 2023-12-05 RX ORDER — METOPROLOL SUCCINATE 200 MG/1
200 TABLET, EXTENDED RELEASE ORAL DAILY
Qty: 90 TABLET | Refills: 3 | Status: SHIPPED | OUTPATIENT
Start: 2023-12-05

## 2023-12-05 NOTE — CARE COORDINATION
Ambulatory Care Management Note        Date/Time:  12/5/2023 10:34 AM    Ccm outreached to patient. No answer at this time, message left. Ccm will close case at this time d/t unable to reach x3. Ccm will remain available as needed.

## 2023-12-20 ENCOUNTER — HOSPITAL ENCOUNTER (OUTPATIENT)
Age: 63
Setting detail: OBSERVATION
LOS: 1 days | Discharge: HOME | End: 2023-12-21
Attending: INTERNAL MEDICINE | Admitting: INTERNAL MEDICINE
Payer: MEDICARE

## 2023-12-20 DIAGNOSIS — Z09 HOSPITAL DISCHARGE FOLLOW-UP: Primary | ICD-10-CM

## 2023-12-20 DIAGNOSIS — I48.91 A-FIB (HCC): ICD-10-CM

## 2023-12-20 PROBLEM — I42.8 NICM (NONISCHEMIC CARDIOMYOPATHY) (HCC): Status: ACTIVE | Noted: 2023-12-20

## 2023-12-20 LAB — TSH W FREE THYROID IF ABNORMAL: 3.19 UIU/ML (ref 0.36–3.74)

## 2023-12-20 PROCEDURE — 96365 THER/PROPH/DIAG IV INF INIT: CPT

## 2023-12-20 PROCEDURE — 2580000003 HC RX 258: Performed by: NURSE PRACTITIONER

## 2023-12-20 PROCEDURE — 6370000000 HC RX 637 (ALT 250 FOR IP): Performed by: NURSE PRACTITIONER

## 2023-12-20 PROCEDURE — 99204 OFFICE O/P NEW MOD 45 MIN: CPT | Performed by: INTERNAL MEDICINE

## 2023-12-20 PROCEDURE — G0378 HOSPITAL OBSERVATION PER HR: HCPCS

## 2023-12-20 PROCEDURE — 96366 THER/PROPH/DIAG IV INF ADDON: CPT

## 2023-12-20 PROCEDURE — 84443 ASSAY THYROID STIM HORMONE: CPT

## 2023-12-20 PROCEDURE — 2500000003 HC RX 250 WO HCPCS: Performed by: NURSE PRACTITIONER

## 2023-12-20 PROCEDURE — 36415 COLL VENOUS BLD VENIPUNCTURE: CPT

## 2023-12-20 RX ORDER — ACETAMINOPHEN 325 MG/1
650 TABLET ORAL EVERY 6 HOURS PRN
Status: DISCONTINUED | OUTPATIENT
Start: 2023-12-20 | End: 2023-12-21 | Stop reason: HOSPADM

## 2023-12-20 RX ORDER — POLYETHYLENE GLYCOL 3350 17 G/17G
17 POWDER, FOR SOLUTION ORAL DAILY PRN
Status: DISCONTINUED | OUTPATIENT
Start: 2023-12-20 | End: 2023-12-21 | Stop reason: HOSPADM

## 2023-12-20 RX ORDER — ONDANSETRON 2 MG/ML
4 INJECTION INTRAMUSCULAR; INTRAVENOUS EVERY 6 HOURS PRN
Status: DISCONTINUED | OUTPATIENT
Start: 2023-12-20 | End: 2023-12-21 | Stop reason: HOSPADM

## 2023-12-20 RX ORDER — POTASSIUM CHLORIDE 20 MEQ/1
40 TABLET, EXTENDED RELEASE ORAL PRN
Status: DISCONTINUED | OUTPATIENT
Start: 2023-12-20 | End: 2023-12-21 | Stop reason: HOSPADM

## 2023-12-20 RX ORDER — SODIUM CHLORIDE 9 MG/ML
INJECTION, SOLUTION INTRAVENOUS PRN
Status: DISCONTINUED | OUTPATIENT
Start: 2023-12-20 | End: 2023-12-21 | Stop reason: HOSPADM

## 2023-12-20 RX ORDER — DILTIAZEM HYDROCHLORIDE 5 MG/ML
10 INJECTION INTRAVENOUS ONCE
Status: COMPLETED | OUTPATIENT
Start: 2023-12-20 | End: 2023-12-20

## 2023-12-20 RX ORDER — SODIUM CHLORIDE 0.9 % (FLUSH) 0.9 %
5-40 SYRINGE (ML) INJECTION EVERY 12 HOURS SCHEDULED
Status: DISCONTINUED | OUTPATIENT
Start: 2023-12-20 | End: 2023-12-21 | Stop reason: HOSPADM

## 2023-12-20 RX ORDER — MAGNESIUM SULFATE IN WATER 40 MG/ML
2000 INJECTION, SOLUTION INTRAVENOUS PRN
Status: DISCONTINUED | OUTPATIENT
Start: 2023-12-20 | End: 2023-12-21 | Stop reason: HOSPADM

## 2023-12-20 RX ORDER — SODIUM CHLORIDE 0.9 % (FLUSH) 0.9 %
5-40 SYRINGE (ML) INJECTION PRN
Status: DISCONTINUED | OUTPATIENT
Start: 2023-12-20 | End: 2023-12-21 | Stop reason: HOSPADM

## 2023-12-20 RX ORDER — PANTOPRAZOLE SODIUM 40 MG/1
40 TABLET, DELAYED RELEASE ORAL
Status: DISCONTINUED | OUTPATIENT
Start: 2023-12-21 | End: 2023-12-21 | Stop reason: HOSPADM

## 2023-12-20 RX ORDER — GABAPENTIN 300 MG/1
300 CAPSULE ORAL 2 TIMES DAILY
Status: DISCONTINUED | OUTPATIENT
Start: 2023-12-20 | End: 2023-12-21 | Stop reason: HOSPADM

## 2023-12-20 RX ORDER — ASPIRIN 81 MG/1
81 TABLET ORAL DAILY
Status: DISCONTINUED | OUTPATIENT
Start: 2023-12-20 | End: 2023-12-21 | Stop reason: HOSPADM

## 2023-12-20 RX ORDER — ONDANSETRON 4 MG/1
4 TABLET, ORALLY DISINTEGRATING ORAL EVERY 8 HOURS PRN
Status: DISCONTINUED | OUTPATIENT
Start: 2023-12-20 | End: 2023-12-21 | Stop reason: HOSPADM

## 2023-12-20 RX ORDER — FLUTICASONE PROPIONATE 50 MCG
2 SPRAY, SUSPENSION (ML) NASAL DAILY
Status: DISCONTINUED | OUTPATIENT
Start: 2023-12-20 | End: 2023-12-21 | Stop reason: HOSPADM

## 2023-12-20 RX ORDER — POTASSIUM CHLORIDE 7.45 MG/ML
10 INJECTION INTRAVENOUS PRN
Status: DISCONTINUED | OUTPATIENT
Start: 2023-12-20 | End: 2023-12-21 | Stop reason: HOSPADM

## 2023-12-20 RX ORDER — METOPROLOL SUCCINATE 100 MG/1
200 TABLET, EXTENDED RELEASE ORAL DAILY
Status: DISCONTINUED | OUTPATIENT
Start: 2023-12-20 | End: 2023-12-21 | Stop reason: HOSPADM

## 2023-12-20 RX ADMIN — SODIUM CHLORIDE 5 MG/HR: 900 INJECTION, SOLUTION INTRAVENOUS at 19:46

## 2023-12-20 RX ADMIN — RIVAROXABAN 20 MG: 20 TABLET, FILM COATED ORAL at 19:37

## 2023-12-20 RX ADMIN — METOPROLOL SUCCINATE 200 MG: 100 TABLET, EXTENDED RELEASE ORAL at 21:27

## 2023-12-20 RX ADMIN — SODIUM CHLORIDE, PRESERVATIVE FREE 10 ML: 5 INJECTION INTRAVENOUS at 19:36

## 2023-12-20 RX ADMIN — ASPIRIN 81 MG: 81 TABLET ORAL at 19:37

## 2023-12-20 RX ADMIN — GABAPENTIN 300 MG: 300 CAPSULE ORAL at 19:37

## 2023-12-20 RX ADMIN — DILTIAZEM HYDROCHLORIDE 10 MG: 5 INJECTION, SOLUTION INTRAVENOUS at 19:33

## 2023-12-20 NOTE — CONSULTS
[facility] Cardiology History & Physical      Date of  Admission: 12/20/2023  5:53 PM     Primary Care Physician: GORAN Burger - CNP  Primary Cardiologist:  Dr. Ben Martino   Admitting Physician:    CC: palpitations and shortness of breath     HPI:  Anthony Drake is a 58 y.o. male with PMH of pAF on Xarelto, GERD, HTN and HLD who presented to the ED at [facility] with c/o shortness of breath and palpitations. EKG showed afib with RVR. CXR without acute pulmonary process. Labs significant for creatinine 1.75 and pBNP 20284. He was treated with cardizem bolus with improved rate. He was transferred to [facility] for further treatment. Reports missing dose of Xarelto Tuesday night. On arrival he remains in afib with rates in 110-120s. PAST CARDIAC HISTORY:  Oct 2020- Echo [facility]) - EF 55-65%, impaired relaxation, aortic root 3.9, no significant valve disease, RVSP 19  May 2022- NST - fixed inferior defect, no ischemia.   Not gated d/t afib  Echo - no show  Nov 2022       Echo - EF 35-40%, global HK, mild RVE, RVSP 46 SOTERO       Ambulatory monitor-persistent RVR, rates to 180  Dec 2022       LHC - minimal CAD, no V-gram  Jan 2023       EMILY - EF 50-55%, successful cardioversion    Past Medical History:   Diagnosis Date    Arthritis     Avascular necrosis of bones of both hips (HCC)     Avascular necrosis of humeral head (HCC)     Chronic foot pain     Chronic left hip pain     Chronic pain     Emphysema     Fatty infiltration of liver     GERD (gastroesophageal reflux disease)     H/O supraventricular tachycardia     History of bilateral hip replacements     History of intracranial aneurysm     History of right shoulder replacement     Hyperesthesia     Hypertension     Mixed hyperlipidemia     Patellar tendon rupture 1987    Peripheral neuropathy     Status post right shoulder hemiarthroplasty       Past Surgical History:   Procedure Laterality Date    CARDIAC PROCEDURE N/A 12/12/2022    Left heart cath / and Affect: Mood normal.         Behavior: Behavior normal.         Thought Content: Thought content normal.         Judgment: Judgment normal.          Cardiographics  Telemetry: AFIB  ECG: atrial fibrillation  Echocardiogram: pending  This SmartLink has not been configured with any valid records.          Labs:   Recent Results (from the past 24 hour(s))   Basic Metabolic Panel    Collection Time: 12/20/23  2:18 PM   Result Value Ref Range    Sodium 142 133 - 143 mmol/L    Potassium 3.7 3.5 - 5.1 mmol/L    Chloride 103 98 - 107 mmol/L    CO2 25 21 - 32 mmol/L    Anion Gap 14 (H) 2 - 11 mmol/L    Glucose 111 (H) 65 - 100 mg/dL    BUN 17 8 - 23 MG/DL    Creatinine 1.75 (H) 0.8 - 1.5 MG/DL    Est, Glom Filt Rate 43 (L) >60 ml/min/1.73m2    Calcium 9.5 8.3 - 10.4 MG/DL   CBC with Auto Differential    Collection Time: 12/20/23  2:18 PM   Result Value Ref Range    WBC 9.7 4.3 - 11.1 K/uL    RBC 4.18 (L) 4.23 - 5.60 M/uL    Hemoglobin 13.4 (L) 13.6 - 17.2 g/dL    Hematocrit 40.8 (L) 41.1 - 50.3 %    MCV 97.6 82.0 - 102.0 FL    MCH 32.1 26.1 - 32.9 PG    MCHC 32.8 31.4 - 35.0 g/dL    RDW 13.1 11.9 - 14.6 %    Platelets 847 849 - 073 K/uL    MPV 9.5 9.4 - 12.3 FL    nRBC 0.00 0.0 - 0.2 K/uL    Differential Type AUTOMATED      Neutrophils % 69 43 - 78 %    Lymphocytes % 20 13 - 44 %    Monocytes % 10 4.0 - 12.0 %    Eosinophils % 1 0.5 - 7.8 %    Basophils % 0 0.0 - 2.0 %    Immature Granulocytes 0 0.0 - 5.0 %    Neutrophils Absolute 6.7 1.7 - 8.2 K/UL    Lymphocytes Absolute 2.0 0.5 - 4.6 K/UL    Monocytes Absolute 1.0 0.1 - 1.3 K/UL    Eosinophils Absolute 0.1 0.0 - 0.8 K/UL    Basophils Absolute 0.0 0.0 - 0.2 K/UL    Absolute Immature Granulocyte 0.0 0.0 - 0.5 K/UL   Troponin T    Collection Time: 12/20/23  2:18 PM   Result Value Ref Range    Troponin T 19.8 0 - 22 ng/L   Hepatic Function Panel    Collection Time: 12/20/23  2:18 PM   Result Value Ref Range    Total Protein 8.3 (H) 6.4 - 8.2 g/dL    Albumin 3.9 3.2 - 4.6

## 2023-12-21 ENCOUNTER — APPOINTMENT (OUTPATIENT)
Dept: CARDIAC CATH/INVASIVE PROCEDURES | Age: 63
End: 2023-12-21
Attending: INTERNAL MEDICINE
Payer: MEDICARE

## 2023-12-21 VITALS
HEIGHT: 74 IN | DIASTOLIC BLOOD PRESSURE: 75 MMHG | BODY MASS INDEX: 27.7 KG/M2 | WEIGHT: 215.83 LBS | TEMPERATURE: 98.2 F | HEART RATE: 67 BPM | SYSTOLIC BLOOD PRESSURE: 113 MMHG | OXYGEN SATURATION: 100 % | RESPIRATION RATE: 18 BRPM

## 2023-12-21 LAB
ANION GAP SERPL CALC-SCNC: 8 MMOL/L (ref 2–11)
BASOPHILS # BLD: 0.1 K/UL (ref 0–0.2)
BASOPHILS NFR BLD: 1 % (ref 0–2)
BUN SERPL-MCNC: 20 MG/DL (ref 8–23)
CALCIUM SERPL-MCNC: 8.8 MG/DL (ref 8.3–10.4)
CHLORIDE SERPL-SCNC: 109 MMOL/L (ref 103–113)
CO2 SERPL-SCNC: 25 MMOL/L (ref 21–32)
CREAT SERPL-MCNC: 1.7 MG/DL (ref 0.8–1.5)
DIFFERENTIAL METHOD BLD: ABNORMAL
ECHO BSA: 2.23 M2
EKG ATRIAL RATE: 59 BPM
EKG DIAGNOSIS: NORMAL
EKG P AXIS: 43 DEGREES
EKG P-R INTERVAL: 164 MS
EKG Q-T INTERVAL: 454 MS
EKG QRS DURATION: 80 MS
EKG QTC CALCULATION (BAZETT): 449 MS
EKG R AXIS: 6 DEGREES
EKG T AXIS: -30 DEGREES
EKG VENTRICULAR RATE: 59 BPM
EOSINOPHIL # BLD: 0.1 K/UL (ref 0–0.8)
EOSINOPHIL NFR BLD: 2 % (ref 0.5–7.8)
ERYTHROCYTE [DISTWIDTH] IN BLOOD BY AUTOMATED COUNT: 13.5 % (ref 11.9–14.6)
GLUCOSE SERPL-MCNC: 95 MG/DL (ref 65–100)
HCT VFR BLD AUTO: 38.1 % (ref 41.1–50.3)
HGB BLD-MCNC: 12.3 G/DL (ref 13.6–17.2)
IMM GRANULOCYTES # BLD AUTO: 0 K/UL (ref 0–0.5)
IMM GRANULOCYTES NFR BLD AUTO: 0 % (ref 0–5)
LYMPHOCYTES # BLD: 2.3 K/UL (ref 0.5–4.6)
LYMPHOCYTES NFR BLD: 29 % (ref 13–44)
MAGNESIUM SERPL-MCNC: 1.9 MG/DL (ref 1.8–2.4)
MCH RBC QN AUTO: 32 PG (ref 26.1–32.9)
MCHC RBC AUTO-ENTMCNC: 32.3 G/DL (ref 31.4–35)
MCV RBC AUTO: 99.2 FL (ref 82–102)
MONOCYTES # BLD: 0.9 K/UL (ref 0.1–1.3)
MONOCYTES NFR BLD: 11 % (ref 4–12)
NEUTS SEG # BLD: 4.5 K/UL (ref 1.7–8.2)
NEUTS SEG NFR BLD: 57 % (ref 43–78)
NRBC # BLD: 0 K/UL (ref 0–0.2)
PLATELET # BLD AUTO: 230 K/UL (ref 150–450)
PMV BLD AUTO: 10.4 FL (ref 9.4–12.3)
POTASSIUM SERPL-SCNC: 3.4 MMOL/L (ref 3.5–5.1)
RBC # BLD AUTO: 3.84 M/UL (ref 4.23–5.6)
SODIUM SERPL-SCNC: 142 MMOL/L (ref 136–146)
WBC # BLD AUTO: 7.9 K/UL (ref 4.3–11.1)

## 2023-12-21 PROCEDURE — 99152 MOD SED SAME PHYS/QHP 5/>YRS: CPT

## 2023-12-21 PROCEDURE — 83735 ASSAY OF MAGNESIUM: CPT

## 2023-12-21 PROCEDURE — 96366 THER/PROPH/DIAG IV INF ADDON: CPT

## 2023-12-21 PROCEDURE — 99153 MOD SED SAME PHYS/QHP EA: CPT

## 2023-12-21 PROCEDURE — 93010 ELECTROCARDIOGRAM REPORT: CPT | Performed by: INTERNAL MEDICINE

## 2023-12-21 PROCEDURE — 85025 COMPLETE CBC W/AUTO DIFF WBC: CPT

## 2023-12-21 PROCEDURE — 93005 ELECTROCARDIOGRAM TRACING: CPT | Performed by: INTERNAL MEDICINE

## 2023-12-21 PROCEDURE — 99152 MOD SED SAME PHYS/QHP 5/>YRS: CPT | Performed by: INTERNAL MEDICINE

## 2023-12-21 PROCEDURE — 6360000002 HC RX W HCPCS: Performed by: INTERNAL MEDICINE

## 2023-12-21 PROCEDURE — 99223 1ST HOSP IP/OBS HIGH 75: CPT | Performed by: INTERNAL MEDICINE

## 2023-12-21 PROCEDURE — 6370000000 HC RX 637 (ALT 250 FOR IP): Performed by: INTERNAL MEDICINE

## 2023-12-21 PROCEDURE — G0378 HOSPITAL OBSERVATION PER HR: HCPCS

## 2023-12-21 PROCEDURE — 80048 BASIC METABOLIC PNL TOTAL CA: CPT

## 2023-12-21 PROCEDURE — 92960 CARDIOVERSION ELECTRIC EXT: CPT

## 2023-12-21 PROCEDURE — 2580000003 HC RX 258: Performed by: NURSE PRACTITIONER

## 2023-12-21 PROCEDURE — 36415 COLL VENOUS BLD VENIPUNCTURE: CPT

## 2023-12-21 PROCEDURE — 93312 ECHO TRANSESOPHAGEAL: CPT | Performed by: INTERNAL MEDICINE

## 2023-12-21 PROCEDURE — 92960 CARDIOVERSION ELECTRIC EXT: CPT | Performed by: INTERNAL MEDICINE

## 2023-12-21 PROCEDURE — 6370000000 HC RX 637 (ALT 250 FOR IP): Performed by: NURSE PRACTITIONER

## 2023-12-21 PROCEDURE — 93320 DOPPLER ECHO COMPLETE: CPT | Performed by: INTERNAL MEDICINE

## 2023-12-21 PROCEDURE — 93325 DOPPLER ECHO COLOR FLOW MAPG: CPT | Performed by: INTERNAL MEDICINE

## 2023-12-21 RX ORDER — AMIODARONE HYDROCHLORIDE 200 MG/1
200 TABLET ORAL 2 TIMES DAILY
Qty: 28 TABLET | Refills: 0 | Status: SHIPPED | OUTPATIENT
Start: 2023-12-21 | End: 2024-01-04

## 2023-12-21 RX ORDER — LIDOCAINE HYDROCHLORIDE 20 MG/ML
SOLUTION OROPHARYNGEAL PRN
Status: COMPLETED | OUTPATIENT
Start: 2023-12-21 | End: 2023-12-21

## 2023-12-21 RX ORDER — FENTANYL CITRATE 50 UG/ML
INJECTION, SOLUTION INTRAMUSCULAR; INTRAVENOUS PRN
Status: COMPLETED | OUTPATIENT
Start: 2023-12-21 | End: 2023-12-21

## 2023-12-21 RX ORDER — AMIODARONE HYDROCHLORIDE 200 MG/1
200 TABLET ORAL DAILY
Qty: 90 TABLET | Refills: 3 | Status: SHIPPED | OUTPATIENT
Start: 2024-01-05

## 2023-12-21 RX ORDER — AMIODARONE HYDROCHLORIDE 200 MG/1
200 TABLET ORAL 2 TIMES DAILY
Status: DISCONTINUED | OUTPATIENT
Start: 2023-12-21 | End: 2023-12-21 | Stop reason: HOSPADM

## 2023-12-21 RX ORDER — MIDAZOLAM HYDROCHLORIDE 1 MG/ML
INJECTION INTRAMUSCULAR; INTRAVENOUS PRN
Status: COMPLETED | OUTPATIENT
Start: 2023-12-21 | End: 2023-12-21

## 2023-12-21 RX ORDER — METOPROLOL SUCCINATE 100 MG/1
100 TABLET, EXTENDED RELEASE ORAL DAILY
Qty: 30 TABLET | Refills: 11 | Status: SHIPPED | OUTPATIENT
Start: 2023-12-21

## 2023-12-21 RX ADMIN — MIDAZOLAM 2 MG: 1 INJECTION INTRAMUSCULAR; INTRAVENOUS at 10:13

## 2023-12-21 RX ADMIN — METOPROLOL SUCCINATE 200 MG: 100 TABLET, EXTENDED RELEASE ORAL at 08:26

## 2023-12-21 RX ADMIN — LIDOCAINE HYDROCHLORIDE 15 ML: 20 SOLUTION ORAL at 09:50

## 2023-12-21 RX ADMIN — MIDAZOLAM 1 MG: 1 INJECTION INTRAMUSCULAR; INTRAVENOUS at 10:17

## 2023-12-21 RX ADMIN — AMIODARONE HYDROCHLORIDE 200 MG: 200 TABLET ORAL at 13:28

## 2023-12-21 RX ADMIN — GABAPENTIN 300 MG: 300 CAPSULE ORAL at 08:26

## 2023-12-21 RX ADMIN — FENTANYL CITRATE 50 MCG: 50 INJECTION, SOLUTION INTRAMUSCULAR; INTRAVENOUS at 10:14

## 2023-12-21 RX ADMIN — MIDAZOLAM 1 MG: 1 INJECTION INTRAMUSCULAR; INTRAVENOUS at 10:18

## 2023-12-21 RX ADMIN — SODIUM CHLORIDE, PRESERVATIVE FREE 10 ML: 5 INJECTION INTRAVENOUS at 08:32

## 2023-12-21 RX ADMIN — MIDAZOLAM 1 MG: 1 INJECTION INTRAMUSCULAR; INTRAVENOUS at 10:20

## 2023-12-21 RX ADMIN — ASPIRIN 81 MG: 81 TABLET ORAL at 08:27

## 2023-12-21 NOTE — PROGRESS NOTES
TRANSFER - OUT REPORT:    Verbal report given to [address] [place] [address] on N[place] Dale  being transferred to [facility] for routine progression of patient care       Report consisted of patient's Situation, Background, Assessment and   Recommendations(SBAR). Information from the following report(s) Nurse Handoff Report was reviewed with the receiving nurse. Lines:   Peripheral IV 12/20/23 Left Antecubital (Active)   Site Assessment Clean, dry & intact 12/21/23 0410   Line Status Flushed; Infusing 12/21/23 0410   Line Care Connections checked and tightened;Cap changed 12/21/23 0410   Phlebitis Assessment No symptoms 12/21/23 0410   Infiltration Assessment 0 12/21/23 0410   Alcohol Cap Used Yes 12/21/23 0410   Dressing Status Clean, dry & intact 12/21/23 0410   Dressing Type Transparent 12/21/23 0410   Dressing Intervention New 12/20/23 1416        Opportunity for questions and clarification was provided.

## 2023-12-21 NOTE — PLAN OF CARE
Problem: Discharge Planning  Goal: Discharge to home or other facility with appropriate resources  Outcome: Adequate for Discharge     Problem: Safety - Adult  Goal: Free from fall injury  Outcome: Adequate for Discharge  Flowsheets (Taken 12/21/2023 0832 by Starr Denver, RN)  Free From Fall Injury: Instruct family/caregiver on patient safety     Problem: ABCDS Injury Assessment  Goal: Absence of physical injury  Outcome: Adequate for Discharge

## 2023-12-21 NOTE — PROGRESS NOTES
Discharge instructions were reviewed with patient. An opportunity was given for questions. All medications were reviewed, and information was given on the new medications. Patient verbalized understanding, and has no questions at this time. IV and heart monitor removed.

## 2023-12-21 NOTE — CARE COORDINATION
Pt is for discharge home today with family. Met with pt briefly prior to his discharge home today. Pt's son will be with for support if needed post discharge today. Pt is normally independent with all ADL's. No needs/supportive care orders recieved for CM at this time.

## 2023-12-21 NOTE — DISCHARGE INSTRUCTIONS
Take Amiodarone 200 mg (1 tablet two times daily for 14 days) after 14 days start taking once a day. The patient is being discharged home in stable condition on a low saturated fat, low cholesterol and low salt diet. The patient is instructed to advance activities as tolerated to the limit of fatigue or shortness of breath. The patient is instructed to call the office or return to the ER for immediate evaluation for any severe shortness of breath, chest pain, prolonged palpitations, near syncope or syncope. amiodarone (oral)  Pronunciation:  LIDIA SETHI ricco nickerson  Brand:  Pacerone  What is the most important information I should know about amiodarone? Amiodarone is for use only in treating life-threatening heart rhythm disorders. You should not take this medicine if you are allergic to amiodarone or iodine, or if you have heart block, a history of slow heartbeats that have caused you to faint, or if your heart cannot pump blood properly. Amiodarone can cause dangerous side effects on your heart, liver, lungs, or vision. Call your doctor or get medical help at once if you have: chest pain, fast or pounding heartbeats, trouble breathing, vision problems, upper stomach pain, vomiting, dark urine, jaundice (yellowing of the skin or eyes), or if you cough up blood. What is amiodarone? Amiodarone affects the rhythm of your heartbeats. Amiodarone is used to help keep the heart beating normally in people with life-threatening heart rhythm disorders of the ventricles (the lower chambers of the heart that allow blood to flow out of the heart). Amiodarone is used to treat ventricular tachycardia or ventricular fibrillation. Amiodarone is for use only in treating life-threatening heart rhythm disorders. Amiodarone may also be used for purposes not listed in this medication guide. What should I discuss with my healthcare provider before taking amiodarone?   You should not use this medicine if you are allergic heartbeats);  a light-headed feeling, like you might pass out;  blurred vision, seeing halos around lights (your eyes may be more sensitive to light);  liver problems --nausea, vomiting, stomach pain (upper right side), tiredness, dark urine, jaundice (yellowing of the skin or eyes);  nerve problems --loss of coordination, muscle weakness, uncontrolled muscle movement, or a prickly feeling in your hands or lower legs;  signs of overactive thyroid --weight loss, thinning hair, feeling hot, increased sweating, tremors, feeling nervous or irritable, irregular menstrual periods, swelling in your neck (goiter); or  signs of underactive thyroid --weight gain, tiredness, depression, trouble concentrating, feeling cold. Common side effects may include:  nausea, vomiting, loss of appetite; or  constipation. This is not a complete list of side effects and others may occur. Call your doctor for medical advice about side effects. You may report side effects to FDA at 3-676-FDA-8890. What other drugs will affect amiodarone? Sometimes it is not safe to use certain medications at the same time. Some drugs can affect your blood levels of other drugs you take, which may increase side effects or make the medications less effective. Amiodarone takes a long time to completely clear from your body. Drug interactions are possible for up to several months after you stop using amiodarone. Talk to your doctor before taking any medication during this time. Keep track of how long it has been since your last dose of amiodarone. Many drugs can affect amiodarone. This includes prescription and over-the-counter medicines, vitamins, and herbal products. Not all possible interactions are listed here. Tell your doctor about all your current medicines and any medicine you start or stop using. Where can I get more information? Your doctor or pharmacist can provide more information about amiodarone.   Remember, keep this and all other medicines No

## 2023-12-21 NOTE — PROGRESS NOTES
EMILY/CVN by Dr Pang Greenfield  II ASA II Mallampati  5mg versed  50mcg fentanyl  Viscous Solution given at 0950  1 shock at 360 joules synced  Post cardioversion rhythm NSR  Pt tolerated well.

## 2023-12-26 ENCOUNTER — CARE COORDINATION (OUTPATIENT)
Dept: CARE COORDINATION | Facility: CLINIC | Age: 63
End: 2023-12-26

## 2023-12-26 NOTE — CARE COORDINATION
Care Transitions Outreach Attempt    Call within 2 business days of discharge: Yes   Attempted to reach patient for transitions of care follow up. Unable to reach patient. Patient: Donato Hogan Patient : 1960 MRN: [id number]    Last Discharge Facility       Date Complaint Diagnosis Description Type Department Provider    23  Hospital discharge follow-up . .. Admission (Discharged) Cuca Russo MD    23 Palpitations Atrial fibrillation with RVR [facility]) ED (TRANSFER) [facility] Basilio Hashimoto, DO              Was this an external facility discharge?  No Discharge Facility Name: [facility]    Noted following upcoming appointments from discharge chart review:   [facility] follow up appointment(s):   Future Appointments   Date Time Provider [address] [place] [address]   2024 10:15 AM Aly Ragsdale MD [facility] AMB   2024  8:00 AM GORAN Anderson CNP [facility] AMB   2024  9:30 AM [facility] ECHO 1 [facility] AMB   2024  8:30 AM Jackelin Morrow MD DS [facility] AMB     Non-[facility]  follow up appointment(s): none noted

## 2023-12-29 ENCOUNTER — CARE COORDINATION (OUTPATIENT)
Dept: CARE COORDINATION | Facility: CLINIC | Age: 63
End: 2023-12-29

## 2023-12-29 NOTE — CARE COORDINATION
Care Transitions Outreach Attempt    Call within 2 business days of discharge: Yes   Attempted to reach patient for transitions of care follow up. Unable to reach patient. Patient: Guzman Left Patient : 1960 MRN: [id number]    Last Discharge Facility       Date Complaint Diagnosis Description Type Department Provider    23  Hospital discharge follow-up . .. Admission (Discharged) Wallace Erickson MD    23 Palpitations Atrial fibrillation with RVR [facility]) ED (TRANSFER) AMANDASEJOAN Carreon, DO              Was this an external facility discharge?  No Discharge Facility Name: [facility]    Noted following upcoming appointments from discharge chart review:   [address] follow up appointment(s):   Future Appointments   Date Time Provider [facility] [address]   2024 10:15 AM Erika Henriquez MD [facility] AMB   2024  8:00 AM GORAN Cortez - CNP [facility] AMB   2024  9:30 AM [facility] ECHO 1 [facility] AMB   2024  8:30 AM Cortney Morrow MD JUS [facility] AMB     Non-[facility]  follow up appointment(s): none noted

## 2024-01-11 ENCOUNTER — TELEPHONE (OUTPATIENT)
Dept: FAMILY MEDICINE CLINIC | Facility: CLINIC | Age: 64
End: 2024-01-11

## 2024-01-11 DIAGNOSIS — G62.9 NEUROPATHY: ICD-10-CM

## 2024-01-11 RX ORDER — GABAPENTIN 300 MG/1
300 CAPSULE ORAL 2 TIMES DAILY
Qty: 180 CAPSULE | Refills: 1 | Status: SHIPPED | OUTPATIENT
Start: 2024-01-11 | End: 2024-02-13 | Stop reason: SDUPTHER

## 2024-01-26 DIAGNOSIS — J30.1 ALLERGIC RHINITIS DUE TO POLLEN, UNSPECIFIED SEASONALITY: ICD-10-CM

## 2024-01-26 RX ORDER — MONTELUKAST SODIUM 10 MG/1
10 TABLET ORAL DAILY
Qty: 90 TABLET | Refills: 1 | OUTPATIENT
Start: 2024-01-26

## 2024-02-13 ENCOUNTER — OFFICE VISIT (OUTPATIENT)
Dept: FAMILY MEDICINE CLINIC | Facility: CLINIC | Age: 64
End: 2024-02-13
Payer: MEDICARE

## 2024-02-13 VITALS
WEIGHT: 221 LBS | OXYGEN SATURATION: 99 % | SYSTOLIC BLOOD PRESSURE: 144 MMHG | HEART RATE: 51 BPM | RESPIRATION RATE: 18 BRPM | DIASTOLIC BLOOD PRESSURE: 84 MMHG | BODY MASS INDEX: 28.36 KG/M2 | HEIGHT: 74 IN | TEMPERATURE: 97 F

## 2024-02-13 DIAGNOSIS — G62.9 NEUROPATHY: ICD-10-CM

## 2024-02-13 DIAGNOSIS — Z12.11 SCREEN FOR COLON CANCER: ICD-10-CM

## 2024-02-13 DIAGNOSIS — G47.00 INSOMNIA, UNSPECIFIED TYPE: ICD-10-CM

## 2024-02-13 DIAGNOSIS — Z00.00 MEDICARE ANNUAL WELLNESS VISIT, SUBSEQUENT: Primary | ICD-10-CM

## 2024-02-13 DIAGNOSIS — I48.19 OTHER PERSISTENT ATRIAL FIBRILLATION (HCC): ICD-10-CM

## 2024-02-13 DIAGNOSIS — Z71.89 ACP (ADVANCE CARE PLANNING): ICD-10-CM

## 2024-02-13 DIAGNOSIS — E78.2 MIXED HYPERLIPIDEMIA: ICD-10-CM

## 2024-02-13 DIAGNOSIS — J30.1 ALLERGIC RHINITIS DUE TO POLLEN, UNSPECIFIED SEASONALITY: ICD-10-CM

## 2024-02-13 DIAGNOSIS — N18.32 STAGE 3B CHRONIC KIDNEY DISEASE (HCC): ICD-10-CM

## 2024-02-13 PROCEDURE — 3079F DIAST BP 80-89 MM HG: CPT | Performed by: NURSE PRACTITIONER

## 2024-02-13 PROCEDURE — G0439 PPPS, SUBSEQ VISIT: HCPCS | Performed by: NURSE PRACTITIONER

## 2024-02-13 PROCEDURE — 99214 OFFICE O/P EST MOD 30 MIN: CPT | Performed by: NURSE PRACTITIONER

## 2024-02-13 PROCEDURE — 3077F SYST BP >= 140 MM HG: CPT | Performed by: NURSE PRACTITIONER

## 2024-02-13 RX ORDER — TRAZODONE HYDROCHLORIDE 50 MG/1
50 TABLET ORAL NIGHTLY
Qty: 90 TABLET | Refills: 1 | Status: SHIPPED | OUTPATIENT
Start: 2024-02-13

## 2024-02-13 RX ORDER — FLUTICASONE PROPIONATE 50 MCG
2 SPRAY, SUSPENSION (ML) NASAL DAILY
Qty: 48 G | Refills: 1 | Status: SHIPPED | OUTPATIENT
Start: 2024-02-13

## 2024-02-13 RX ORDER — GABAPENTIN 300 MG/1
300 CAPSULE ORAL 2 TIMES DAILY
Qty: 180 CAPSULE | Refills: 1 | Status: SHIPPED | OUTPATIENT
Start: 2024-02-13 | End: 2024-08-11

## 2024-02-13 RX ORDER — ATORVASTATIN CALCIUM 20 MG/1
20 TABLET, FILM COATED ORAL DAILY
Qty: 90 TABLET | Refills: 1 | Status: SHIPPED | OUTPATIENT
Start: 2024-02-13

## 2024-02-13 RX ORDER — MONTELUKAST SODIUM 10 MG/1
10 TABLET ORAL DAILY
Qty: 90 TABLET | Refills: 1 | Status: SHIPPED | OUTPATIENT
Start: 2024-02-13

## 2024-02-13 NOTE — PROGRESS NOTES
Medicare Annual Wellness Visit    MINH Valdes is here for Medicare AWV (Subsequent AWV), Hyperlipidemia (Follow up ), Discuss Medications, and Medication Refill    Assessment & Plan   Medicare annual wellness visit, subsequent  Insomnia, unspecified type  The following orders have not been finalized:  -     traZODone (DESYREL) 50 MG tablet  Other persistent atrial fibrillation (HCC)  Assessment & Plan:   Monitored by specialist- no acute findings meriting change in the plan  Stage 3b chronic kidney disease (HCC)  Assessment & Plan:   Unclear control, continue current plan pending work up below  Allergic rhinitis due to pollen, unspecified seasonality  The following orders have not been finalized:  -     fluticasone (FLONASE) 50 MCG/ACT nasal spray  -     montelukast (SINGULAIR) 10 MG tablet  Neuropathy  The following orders have not been finalized:  -     gabapentin (NEURONTIN) 300 MG capsule  Mixed hyperlipidemia  The following orders have not been finalized:  -     atorvastatin (LIPITOR) 20 MG tablet    AMW done today- ordered colonoscopy and ACP.     Chronic conditions- reviewed blood work and reconciled medications. Follow up with cardiology in April. Other conditions stable. Talked about kidneys and how to improve these. Need to work on monitoring bp as well.     Patient instructed to blood pressure should be below 140/90. Ways to do this include lose weight, choose a diet low in fat and rich in fruits, vegetables, and low-fat dairy products. Patient also instructed to reduce the amount of salt in diet, participate in 30 minutes of activity a day, cut down on alcohol. Patient instructed to get a home blood pressure meter. People who check their own blood pressure at home do better at keeping it low and can sometimes even reduce the amount of medicine they take.    All measures to prevent CKD explained . Advised to avoid NSAIDS. Take only Tylenol PRN Advised to reduce Red meat like beef and pork, instead

## 2024-02-14 ENCOUNTER — CLINICAL DOCUMENTATION (OUTPATIENT)
Dept: SPIRITUAL SERVICES | Age: 64
End: 2024-02-14

## 2024-02-14 NOTE — PROGRESS NOTES
Advance Care Planning   Ambulatory ACP Specialist Patient Outreach    Date:  2/14/2024    ACP Specialist:  Alyssa Hernandez    Outreach call to patient in follow-up to ACP Specialist referral from:Josefa Preston APRN - CNP    [x] PCP  [] Provider   [] Ambulatory Care Management [] Other     For:                  [x] Advance Directive Assistance              [] Complete Portable DNR order              [] Complete POST/POLST/MOST              [] Code Status Discussion             [] Discuss Goals of Care             [] Early ACP Decision-Making              [x] Other (Specify)      Date Referral Received: 2/14/24    Next Step:   [] ACP scheduled conversation  [x] Outreach again in one week               [] Email / Mail ACP Info Sheets  [] Email / Mail Advance Directive   [] Closing referral.  Routing closure to referring provider/staff and to ACP Specialist .    [] Closure letter mailed to patient with invitation to contact ACP Specialist if / when ready.   [] Other (Specify here):         [x] At this time, Healthcare Decision Maker Is: Advance Care Planning   Healthcare Decision Maker:    Primary Decision Maker: Madie Valdes [facility] [phone]          [] Primary agent named in scanned advance directive.    [x] Legal Next of Kin.     [] Unable to determine legal decision maker at this time.         Outreaches:         [x] 1st -  Date:  2/14/24               Intervention:  [] Spoke with Patient   [x] Left Voice mail [] Email / Mail    [] Xeroshart  [] Other (Specify) :     Outcomes:  Outreach phone call to the patient on mobile phone number.  Unable to reach, left voicemail message with call back information.  Will attempt to follow up in one week.  82% Risk of Admission or ED visit           [] 2nd -  Date:                 Intervention:  [] Spoke with Patient  [] Left Voice mail [] Email / Mail    [] Xeroshart  [] Other (Specify) :              Outcomes:                [] 3rd -  Date:

## 2024-02-19 ENCOUNTER — CLINICAL DOCUMENTATION (OUTPATIENT)
Dept: SPIRITUAL SERVICES | Age: 64
End: 2024-02-19

## 2024-02-27 ENCOUNTER — CLINICAL DOCUMENTATION (OUTPATIENT)
Dept: CARE COORDINATION | Facility: CLINIC | Age: 64
End: 2024-02-27

## 2024-02-27 NOTE — ACP (ADVANCE CARE PLANNING)
Advance Care Planning   Ambulatory ACP Specialist Patient Outreach    Date:  2/27/2024    ACP Specialist:  RENAE JENKINS    Outreach call to patient in follow-up to ACP Specialist referral from:Josefa Preston APRN - CNP    [x] PCP  [] Provider   [] Ambulatory Care Management [] Other     For:                  [x] Advance Directive Assistance              [] Complete Portable DNR order              [] Complete POST/POLST/MOST              [] Code Status Discussion             [x] Discuss Goals of Care             [x] Early ACP Decision-Making              [x] Other (Specify)    Date Referral Received:12/14/2024    Next Step:   [] ACP scheduled conversation  [] Outreach again in one week               [] Email / Mail ACP Info Sheets  [] Email / Mail Advance Directive   [] Closing referral.  Routing closure to referring provider/staff and to ACP Specialist .    [] Closure letter mailed to patient with invitation to contact ACP Specialist if / when ready.   [x] Other (Specify here):         [x] At this time, Healthcare Decision Maker Is:        [] Primary agent named in scanned advance directive.    [x] Legal Next of Kin.     [] Unable to determine legal decision maker at this time.                [x]  Additional Outreach -  Date:  2/27/2024   (Specify Dates & special circumstances):    Outcomes: Called the patient at the appointed time for ACP conversation. LVM requesting return call. If no response will follow again in one week.         Thank you for this referral.

## 2024-03-28 RX ORDER — RIVAROXABAN 20 MG/1
TABLET, FILM COATED ORAL
Qty: 30 TABLET | Refills: 5 | Status: SHIPPED | OUTPATIENT
Start: 2024-03-28

## 2024-04-03 ENCOUNTER — CLINICAL DOCUMENTATION (OUTPATIENT)
Dept: CARE COORDINATION | Facility: CLINIC | Age: 64
End: 2024-04-03

## 2024-04-05 PROBLEM — I50.21 ACUTE SYSTOLIC CONGESTIVE HEART FAILURE (HCC): Status: RESOLVED | Noted: 2022-12-07 | Resolved: 2024-04-05

## 2024-04-05 PROBLEM — R00.0 TACHYCARDIA INDUCED CARDIOMYOPATHY (HCC): Status: ACTIVE | Noted: 2023-12-20

## 2024-04-05 PROBLEM — I43 TACHYCARDIA INDUCED CARDIOMYOPATHY (HCC): Status: ACTIVE | Noted: 2023-12-20

## 2024-04-05 PROBLEM — I48.0 PAROXYSMAL ATRIAL FIBRILLATION (HCC): Status: ACTIVE | Noted: 2024-04-05

## 2024-04-05 PROBLEM — I48.91 ATRIAL FIBRILLATION WITH RAPID VENTRICULAR RESPONSE (HCC): Status: RESOLVED | Noted: 2023-12-20 | Resolved: 2024-04-05

## 2024-04-11 ENCOUNTER — CLINICAL DOCUMENTATION (OUTPATIENT)
Dept: CARE COORDINATION | Facility: CLINIC | Age: 64
End: 2024-04-11

## 2024-04-29 NOTE — PROGRESS NOTES
(SINGULAIR) 10 MG tablet Take 1 tablet by mouth daily 2/13/24  Yes Josefa Preston APRN - CNP   gabapentin (NEURONTIN) 300 MG capsule Take 1 capsule by mouth in the morning and at bedtime for 180 days. 2/13/24 8/11/24 Yes Josefa Preston APRN - CNP   fluticasone (FLONASE) 50 MCG/ACT nasal spray 2 sprays by Each Nostril route daily 2/13/24  Yes Josefa Preston APRN - CNP   atorvastatin (LIPITOR) 20 MG tablet Take 1 tablet by mouth daily 2/13/24  Yes Josefa Preston APRN - CNP   Metoprolol Succinate 100 MG CS24 Take by mouth   Yes ProviderGovind MD   amiodarone (CORDARONE) 200 MG tablet Take 1 tablet by mouth daily 1/5/24  Yes Herrera Brown APRN - CNP   valsartan (DIOVAN) 80 MG tablet TAKE 1 TABLET BY MOUTH EVERY DAY 11/13/23  Yes Carmen Morrow MD   ketoconazole (NIZORAL) 2 % cream Apply topically daily Apply topically daily.   Yes ProviderGovind MD   aspirin 81 MG EC tablet Take 1 tablet by mouth daily 12/7/22  Yes Carmen Morrow MD   esomeprazole (NEXIUM) 20 MG delayed release capsule Take 1 capsule by mouth daily 3/10/21  Yes Automatic Reconciliation, Ar     Allergies   Allergen Reactions    Morphine Itching    Caffeine Palpitations     Effects Heart rhythm     Past Medical History:   Diagnosis Date    Arthritis     Avascular necrosis of bones of both hips (HCC)     Avascular necrosis of humeral head (HCC)     Chronic foot pain     Chronic left hip pain     Chronic pain     Emphysema     Fatty infiltration of liver     GERD (gastroesophageal reflux disease)     H/O supraventricular tachycardia     History of bilateral hip replacements     History of intracranial aneurysm     History of right shoulder replacement     Hyperesthesia     Hypertension     Mixed hyperlipidemia     Patellar tendon rupture 1987    Peripheral neuropathy     Status post right shoulder hemiarthroplasty      Past Surgical History:   Procedure Laterality Date    CARDIAC PROCEDURE N/A

## 2024-04-30 ENCOUNTER — OFFICE VISIT (OUTPATIENT)
Age: 64
End: 2024-04-30
Payer: MEDICARE

## 2024-04-30 VITALS
HEIGHT: 74 IN | BODY MASS INDEX: 28.7 KG/M2 | SYSTOLIC BLOOD PRESSURE: 130 MMHG | HEART RATE: 60 BPM | WEIGHT: 223.6 LBS | DIASTOLIC BLOOD PRESSURE: 84 MMHG

## 2024-04-30 DIAGNOSIS — R00.0 TACHYCARDIA INDUCED CARDIOMYOPATHY (HCC): ICD-10-CM

## 2024-04-30 DIAGNOSIS — Z72.0 TOBACCO USE: ICD-10-CM

## 2024-04-30 DIAGNOSIS — I48.0 PAROXYSMAL ATRIAL FIBRILLATION (HCC): ICD-10-CM

## 2024-04-30 DIAGNOSIS — I10 ESSENTIAL HYPERTENSION: Primary | ICD-10-CM

## 2024-04-30 DIAGNOSIS — N18.32 STAGE 3B CHRONIC KIDNEY DISEASE (HCC): ICD-10-CM

## 2024-04-30 DIAGNOSIS — I43 TACHYCARDIA INDUCED CARDIOMYOPATHY (HCC): ICD-10-CM

## 2024-04-30 PROCEDURE — 3075F SYST BP GE 130 - 139MM HG: CPT | Performed by: INTERNAL MEDICINE

## 2024-04-30 PROCEDURE — 99214 OFFICE O/P EST MOD 30 MIN: CPT | Performed by: INTERNAL MEDICINE

## 2024-04-30 PROCEDURE — 3079F DIAST BP 80-89 MM HG: CPT | Performed by: INTERNAL MEDICINE

## 2024-04-30 RX ORDER — NICOTINE 21 MG/24HR
1 PATCH, TRANSDERMAL 24 HOURS TRANSDERMAL DAILY
Qty: 14 PATCH | Refills: 0 | Status: SHIPPED | OUTPATIENT
Start: 2024-04-30 | End: 2024-05-14

## 2024-04-30 RX ORDER — CETIRIZINE HYDROCHLORIDE 10 MG/1
10 TABLET ORAL DAILY
COMMUNITY

## 2024-04-30 ASSESSMENT — ENCOUNTER SYMPTOMS: SHORTNESS OF BREATH: 0

## 2024-04-30 NOTE — PATIENT INSTRUCTIONS
[id number]              Text \"QUIT\" to [id number]    Patient Education        Quitting Tobacco: Care Instructions  Quitting tobacco is much harder than simply changing a habit. Nicotine cravings make it hard to quit, but you can do it. Your doctor will help you set up the plan that best meets your needs.    You will miss the nicotine at first. You may feel short-tempered and grumpy. You may have trouble sleeping or thinking clearly. The urge to use tobacco may continue for a time.    Combining tools can raise your chances of success. You can use medicine along with counseling. And you can join a quit-tobacco program, such as the American Lung Association's Freedom from Smoking program.    Get support.  Reach out to family and friends, and find a counselor to help you quit. Join a support group, such as Nicotine Anonymous. Go to www.smokefree.gov to sign up for text messaging support.     Talk to your doctor or pharmacist about medicines that can help you quit.  Medicines can help with cravings and withdrawal symptoms. There are several over-the-counter choices, such as nicotine patches, gum, and lozenges.     After you quit, do not use tobacco again, not even once.  Get rid of all tobacco products and anything that reminds you of tobacco, such as ashtrays.     Avoid things that make you reach for tobacco.  Change your daily routine. Take a different route to work, or eat a meal in a different place.     Try to cut down on stress.  Find ways to calm yourself, such as taking a hot bath or doing deep breathing exercises.     Eat a healthy diet, and get regular exercise.  Having healthy habits may help you quit using tobacco.     Don't give up on quitting if you use tobacco again.  Most people quit and restart a few times before they quit for good.   Follow-up care is a key part of your treatment and safety. Be sure to make and go to all appointments, and call your doctor if you are having problems. It's also a good idea

## 2024-05-15 ENCOUNTER — TELEPHONE (OUTPATIENT)
Age: 64
End: 2024-05-15

## 2024-05-15 ENCOUNTER — FOLLOWUP TELEPHONE ENCOUNTER (OUTPATIENT)
Dept: GASTROENTEROLOGY | Age: 64
End: 2024-05-15

## 2024-05-15 NOTE — TELEPHONE ENCOUNTER
I spoke with patient. He's down to 5-6 cigarettes a day.  He never picked up the nicotine patches sent to his [facility] in [place], he was waiting for them to call him.  He will go by today or tomorrow to begin those as we previously discussed.  Encouraged him to set up a SMART plan of action for goals and quit date, and will plan to treat for at least 3 months after quitting.  Congratulated current efforts and will check back in a few weeks

## 2024-05-16 ENCOUNTER — TELEPHONE (OUTPATIENT)
Dept: GASTROENTEROLOGY | Age: 64
End: 2024-05-16

## 2024-05-22 ENCOUNTER — OFFICE VISIT (OUTPATIENT)
Dept: FAMILY MEDICINE CLINIC | Facility: CLINIC | Age: 64
End: 2024-05-22
Payer: MEDICARE

## 2024-05-22 VITALS
BODY MASS INDEX: 27.46 KG/M2 | RESPIRATION RATE: 16 BRPM | OXYGEN SATURATION: 97 % | WEIGHT: 214 LBS | SYSTOLIC BLOOD PRESSURE: 160 MMHG | DIASTOLIC BLOOD PRESSURE: 84 MMHG | HEART RATE: 61 BPM | HEIGHT: 74 IN | TEMPERATURE: 97.2 F

## 2024-05-22 DIAGNOSIS — I10 ESSENTIAL HYPERTENSION: ICD-10-CM

## 2024-05-22 DIAGNOSIS — N18.32 CKD STAGE 3B, GFR 30-44 ML/MIN (HCC): ICD-10-CM

## 2024-05-22 DIAGNOSIS — Z72.0 TOBACCO USE: ICD-10-CM

## 2024-05-22 DIAGNOSIS — G62.9 NEUROPATHY: Primary | ICD-10-CM

## 2024-05-22 DIAGNOSIS — E78.2 MIXED HYPERLIPIDEMIA: ICD-10-CM

## 2024-05-22 LAB
ALBUMIN SERPL-MCNC: 3.9 G/DL (ref 3.2–4.6)
ALBUMIN/GLOB SERPL: 1 (ref 1–1.9)
ALP SERPL-CCNC: 100 U/L (ref 40–129)
ALT SERPL-CCNC: 10 U/L (ref 12–65)
ANION GAP SERPL CALC-SCNC: 9 MMOL/L (ref 9–18)
AST SERPL-CCNC: 21 U/L (ref 15–37)
BASOPHILS # BLD: 0.1 K/UL (ref 0–0.2)
BASOPHILS NFR BLD: 1 % (ref 0–2)
BILIRUB SERPL-MCNC: 0.6 MG/DL (ref 0–1.2)
BUN SERPL-MCNC: 21 MG/DL (ref 8–23)
CALCIUM SERPL-MCNC: 9.2 MG/DL (ref 8.8–10.2)
CHLORIDE SERPL-SCNC: 106 MMOL/L (ref 98–107)
CHOLEST SERPL-MCNC: 138 MG/DL (ref 0–200)
CO2 SERPL-SCNC: 27 MMOL/L (ref 20–28)
CREAT SERPL-MCNC: 1.61 MG/DL (ref 0.8–1.3)
DIFFERENTIAL METHOD BLD: ABNORMAL
EOSINOPHIL # BLD: 0.1 K/UL (ref 0–0.8)
EOSINOPHIL NFR BLD: 1 % (ref 0.5–7.8)
ERYTHROCYTE [DISTWIDTH] IN BLOOD BY AUTOMATED COUNT: 15.4 % (ref 11.9–14.6)
GLOBULIN SER CALC-MCNC: 3.9 G/DL (ref 2.3–3.5)
GLUCOSE SERPL-MCNC: 98 MG/DL (ref 70–99)
HCT VFR BLD AUTO: 39.6 % (ref 41.1–50.3)
HDLC SERPL-MCNC: 73 MG/DL (ref 40–60)
HDLC SERPL: 1.9 (ref 0–5)
HGB BLD-MCNC: 12.6 G/DL (ref 13.6–17.2)
IMM GRANULOCYTES # BLD AUTO: 0 K/UL (ref 0–0.5)
IMM GRANULOCYTES NFR BLD AUTO: 0 % (ref 0–5)
LDLC SERPL CALC-MCNC: 49 MG/DL (ref 0–100)
LYMPHOCYTES # BLD: 1.4 K/UL (ref 0.5–4.6)
LYMPHOCYTES NFR BLD: 21 % (ref 13–44)
MCH RBC QN AUTO: 30.4 PG (ref 26.1–32.9)
MCHC RBC AUTO-ENTMCNC: 31.8 G/DL (ref 31.4–35)
MCV RBC AUTO: 95.4 FL (ref 82–102)
MONOCYTES # BLD: 0.6 K/UL (ref 0.1–1.3)
MONOCYTES NFR BLD: 9 % (ref 4–12)
NEUTS SEG # BLD: 4.6 K/UL (ref 1.7–8.2)
NEUTS SEG NFR BLD: 68 % (ref 43–78)
NRBC # BLD: 0 K/UL (ref 0–0.2)
PLATELET # BLD AUTO: 273 K/UL (ref 150–450)
PMV BLD AUTO: 10.9 FL (ref 9.4–12.3)
POTASSIUM SERPL-SCNC: 4.3 MMOL/L (ref 3.5–5.1)
PROT SERPL-MCNC: 7.8 G/DL (ref 6.3–8.2)
RBC # BLD AUTO: 4.15 M/UL (ref 4.23–5.6)
SODIUM SERPL-SCNC: 142 MMOL/L (ref 136–145)
TRIGL SERPL-MCNC: 79 MG/DL (ref 0–150)
VLDLC SERPL CALC-MCNC: 16 MG/DL (ref 6–23)
WBC # BLD AUTO: 6.7 K/UL (ref 4.3–11.1)

## 2024-05-22 PROCEDURE — 99214 OFFICE O/P EST MOD 30 MIN: CPT | Performed by: NURSE PRACTITIONER

## 2024-05-22 PROCEDURE — 3079F DIAST BP 80-89 MM HG: CPT | Performed by: NURSE PRACTITIONER

## 2024-05-22 PROCEDURE — 3077F SYST BP >= 140 MM HG: CPT | Performed by: NURSE PRACTITIONER

## 2024-05-22 RX ORDER — GABAPENTIN 300 MG/1
600 CAPSULE ORAL 2 TIMES DAILY
Qty: 360 CAPSULE | Refills: 0 | Status: SHIPPED | OUTPATIENT
Start: 2024-05-22 | End: 2024-08-20

## 2024-05-22 SDOH — ECONOMIC STABILITY: FOOD INSECURITY: WITHIN THE PAST 12 MONTHS, THE FOOD YOU BOUGHT JUST DIDN'T LAST AND YOU DIDN'T HAVE MONEY TO GET MORE.: NEVER TRUE

## 2024-05-22 SDOH — ECONOMIC STABILITY: FOOD INSECURITY: WITHIN THE PAST 12 MONTHS, YOU WORRIED THAT YOUR FOOD WOULD RUN OUT BEFORE YOU GOT MONEY TO BUY MORE.: NEVER TRUE

## 2024-05-22 SDOH — ECONOMIC STABILITY: INCOME INSECURITY: HOW HARD IS IT FOR YOU TO PAY FOR THE VERY BASICS LIKE FOOD, HOUSING, MEDICAL CARE, AND HEATING?: NOT HARD AT ALL

## 2024-05-22 ASSESSMENT — PATIENT HEALTH QUESTIONNAIRE - PHQ9
SUM OF ALL RESPONSES TO PHQ9 QUESTIONS 1 & 2: 0
SUM OF ALL RESPONSES TO PHQ QUESTIONS 1-9: 0
2. FEELING DOWN, DEPRESSED OR HOPELESS: NOT AT ALL
SUM OF ALL RESPONSES TO PHQ QUESTIONS 1-9: 0
SUM OF ALL RESPONSES TO PHQ QUESTIONS 1-9: 0
1. LITTLE INTEREST OR PLEASURE IN DOING THINGS: NOT AT ALL
SUM OF ALL RESPONSES TO PHQ QUESTIONS 1-9: 0

## 2024-05-22 NOTE — PROGRESS NOTES
MINH Valdes (: 1960) presents today for follow up.     He has past history of high cholesterol, high blood pressure, atrial fibrillation, CKD.    Blood work checked last showing kidney disease- GFR 44, normal lipids.     Only seeing cardiology and myself currently. Has never seen kidney specialist.     Saw cardiology for follow up 2024-plan-   \"IMPRESSION:  Maintaining SR but on amiodarone.  At 63 potentially facing decades of this which increases risk of adverse effects.  Reschedule with Dr Cleaning to visit other options such as ablation or alternative AAD as they discussed while in house.       Most of our time today spent discussion and planning smoking cessation.  Low nicotine dependence on the Fagerstrom scale.  Recommend 14 mg topical nicotine for 2 weeks followed by 7 mg for 4 weeks.  His biggest temptation is after his evening meal.  Make specific goals, in writing, to taper off beginning with that one if possible.  Schedule a 10 minute walk after meals.  He plans to join a gym as well.  I will call him in ~ 2 weeks for follow up on how he's doing.      EF remains normal, NYHA I continue current therapy  Return in about 6 months (around 10/30/2024) for APPT DR CLEANING NOW, ME IN 6 MONTHS. I WILL CALL HIM ~ 2 WEEKS FOR SMOKING CESSATION.\"      Insomnia- on trazodone 50 mg- working well for him.      Allergies- on Singulair.       Cholesterol   On baby aspirin, 20 mg Lipitor.      Kidneys  Has not seen nephrology in the past. GFR 44, creatinine 1.7- 2023.     GERD- on 20 mg Nexium- controls symptoms.      Hypertension/atrial fibrillation- when he checks bp at home 140/80s. Seeing cardiology.    Neuropathy- on 300 mg gabapentin BID and not working that well. Took lyrica in the past and thinks it made him gain weight.     Chief Complaint   Patient presents with    Follow-up     Patient Active Problem List   Diagnosis

## 2024-05-22 NOTE — PATIENT INSTRUCTIONS
[place] Quit [place] 800-QUIT-NOW  FREE nicotine patches, gum, or lozenges are available to eligible [place] who enroll in Quitline services.  Services include:  One-on-one coaching (phone or web-based counseling and support)   Development of a personalized quit plan.  You can also enroll for quit support services online at www.quitnow.net/[place]/.

## 2024-05-23 ENCOUNTER — TELEPHONE (OUTPATIENT)
Age: 64
End: 2024-05-23

## 2024-05-23 DIAGNOSIS — N18.32 CKD STAGE 3B, GFR 30-44 ML/MIN (HCC): ICD-10-CM

## 2024-05-23 LAB
CREAT UR-MCNC: 382 MG/DL (ref 39–259)
MICROALBUMIN UR-MCNC: 2.07 MG/DL (ref 0–20)
MICROALBUMIN/CREAT UR-RTO: 5 MG/G (ref 0–30)

## 2024-05-23 NOTE — TELEPHONE ENCOUNTER
Called patient to schedule direct colon screening / lmovm                  Pt is ready to schedule colonoscopy.

## 2024-05-28 ENCOUNTER — PREP FOR PROCEDURE (OUTPATIENT)
Dept: GASTROENTEROLOGY | Age: 64
End: 2024-05-28

## 2024-05-28 DIAGNOSIS — Z12.11 ENCOUNTER FOR SCREENING COLONOSCOPY: ICD-10-CM

## 2024-05-28 RX ORDER — SODIUM CHLORIDE 0.9 % (FLUSH) 0.9 %
5-40 SYRINGE (ML) INJECTION PRN
Status: CANCELLED | OUTPATIENT
Start: 2024-05-28

## 2024-05-28 RX ORDER — SODIUM CHLORIDE 0.9 % (FLUSH) 0.9 %
5-40 SYRINGE (ML) INJECTION EVERY 12 HOURS SCHEDULED
Status: CANCELLED | OUTPATIENT
Start: 2024-05-28

## 2024-05-28 RX ORDER — SODIUM CHLORIDE 9 MG/ML
25 INJECTION, SOLUTION INTRAVENOUS PRN
Status: CANCELLED | OUTPATIENT
Start: 2024-05-28

## 2024-05-29 ENCOUNTER — TELEPHONE (OUTPATIENT)
Age: 64
End: 2024-05-29

## 2024-05-29 NOTE — TELEPHONE ENCOUNTER
Cardiac Clearance           Physician or Practice Requesting:Dr. Olvera / [facility]  : [place]  Contact Phone Number: [phone]  Fax Number: [phone]  Date of Surgery/Procedure: 5/31  Type of Surgery or Procedure: colonoscopy  Type of Anesthesia: general  Type of Clearance Requested: Cardiac Clearance and Medication Hold  Medication to Hold:xarelto on for Afib  Days to Hold: 3 days

## 2024-05-29 NOTE — TELEPHONE ENCOUNTER
Cardiac Clearance        Physician or Practice Requesting:Dr. Olvera / [facility]  : [facility]  Contact Phone Number: [phone]  Fax Number: [phone]  Date of Surgery/Procedure: 5/31  Type of Surgery or Procedure: colonoscopy  Type of Anesthesia: general  Type of Clearance Requested: Cardiac Clearance and Medication Hold  Medication to Hold:xarelto  Days to Hold: 3 days

## 2024-05-30 RX ORDER — NALOXONE HYDROCHLORIDE 0.4 MG/ML
INJECTION, SOLUTION INTRAMUSCULAR; INTRAVENOUS; SUBCUTANEOUS PRN
Status: CANCELLED | OUTPATIENT
Start: 2024-05-30

## 2024-05-30 NOTE — TELEPHONE ENCOUNTER
Carmen Morrow MD  You1 hour ago (7:50 AM)     BM  Low risk to hold as requested, see below for my general recommendations, and feel free to use this for calls about endoscopy and dental procedures:    Endoscopic procedures are low risk procedures.  For patients taking anticoagulant for atrial fibrillation, and who have not had cardioversion in the preceding 6 weeks or prior embolic stroke, it is low risk to hold:    Apixaban (Eliquis) for 24-48 hours prior. Resume 24 hours post unless there is a contraindication  Rivaroxoban (Xarelto) for 24-72 hours prior Resume 24 hours post unless there is a contraindication  Dabigatran (Pradaxa) for 24-72 hours prior Resume 24 hours post unless there is a contraindication  Warfarin (Coumadin/Jantoven) for 2-3 days prior. Resume at usual dose same day of procedure and notify anticoagulation clinic.    If the patient is anticoagulated for a mechanical valve, please evaluate with the primary cardiologist.  If the patient's anticoagulation regimen is administered or followed by another provider, please contact that practice.    Dental procedures are low risk procedures.  For patients taking anticoagulant for atrial fibrillation, and who have not had cardioversion in the preceding 6 weeks, it is low risk to hold:    For extraction of a single tooth - do not hold anticoagulants.  For extraction of multiple teeth, or more extensive oral procedures:    Apixaban (Eliquis) for 24-48 hours prior. Resume 24 hours post unless there is a contraindication  Rivaroxoban (Xarelto) for 24-72 hours prior Resume 24 hours post unless there is a contraindication  Dabigatran (Pradaxa) for 24-72 hours prior Resume 24 hours post unless there is a contraindication  Warfarin (Coumadin/Jantoven) for 2-3 days prior. Resume at usual dose same day of procedure and notify anticoagulation clinic.    If the patient is anticoagulated for a mechanical valve, please evaluate with the primary

## 2024-05-30 NOTE — PROGRESS NOTES
RN left message with Pt to confirm appointment time of 1130, arrival time 1000, location,  requirement, and instructions for registration at the hospital.

## 2024-05-31 ENCOUNTER — HOSPITAL ENCOUNTER (OUTPATIENT)
Age: 64
Setting detail: OUTPATIENT SURGERY
Discharge: HOME | End: 2024-05-31
Attending: STUDENT IN AN ORGANIZED HEALTH CARE EDUCATION/TRAINING PROGRAM | Admitting: STUDENT IN AN ORGANIZED HEALTH CARE EDUCATION/TRAINING PROGRAM
Payer: MEDICARE

## 2024-05-31 ENCOUNTER — ANESTHESIA (OUTPATIENT)
Dept: ENDOSCOPY | Age: 64
End: 2024-05-31
Payer: MEDICARE

## 2024-05-31 ENCOUNTER — ANESTHESIA EVENT (OUTPATIENT)
Dept: ENDOSCOPY | Age: 64
End: 2024-05-31
Payer: MEDICARE

## 2024-05-31 VITALS
WEIGHT: 222 LBS | HEART RATE: 93 BPM | RESPIRATION RATE: 16 BRPM | TEMPERATURE: 97.7 F | HEIGHT: 74 IN | DIASTOLIC BLOOD PRESSURE: 80 MMHG | OXYGEN SATURATION: 100 % | SYSTOLIC BLOOD PRESSURE: 177 MMHG | BODY MASS INDEX: 28.49 KG/M2

## 2024-05-31 DIAGNOSIS — Z12.11 ENCOUNTER FOR SCREENING COLONOSCOPY: ICD-10-CM

## 2024-05-31 LAB
EKG ATRIAL RATE: 46 BPM
EKG DIAGNOSIS: NORMAL
EKG P AXIS: 67 DEGREES
EKG P-R INTERVAL: 182 MS
EKG Q-T INTERVAL: 406 MS
EKG QRS DURATION: 92 MS
EKG QTC CALCULATION (BAZETT): 355 MS
EKG R AXIS: 13 DEGREES
EKG T AXIS: 19 DEGREES
EKG VENTRICULAR RATE: 46 BPM

## 2024-05-31 PROCEDURE — 2500000003 HC RX 250 WO HCPCS: Performed by: REGISTERED NURSE

## 2024-05-31 PROCEDURE — 6360000002 HC RX W HCPCS

## 2024-05-31 PROCEDURE — 3700000001 HC ADD 15 MINUTES (ANESTHESIA): Performed by: STUDENT IN AN ORGANIZED HEALTH CARE EDUCATION/TRAINING PROGRAM

## 2024-05-31 PROCEDURE — 3609010600 HC COLONOSCOPY POLYPECTOMY SNARE/COLD BIOPSY: Performed by: STUDENT IN AN ORGANIZED HEALTH CARE EDUCATION/TRAINING PROGRAM

## 2024-05-31 PROCEDURE — 7100000010 HC PHASE II RECOVERY - FIRST 15 MIN: Performed by: STUDENT IN AN ORGANIZED HEALTH CARE EDUCATION/TRAINING PROGRAM

## 2024-05-31 PROCEDURE — 88305 TISSUE EXAM BY PATHOLOGIST: CPT

## 2024-05-31 PROCEDURE — 2709999900 HC NON-CHARGEABLE SUPPLY: Performed by: STUDENT IN AN ORGANIZED HEALTH CARE EDUCATION/TRAINING PROGRAM

## 2024-05-31 PROCEDURE — 6360000002 HC RX W HCPCS: Performed by: ANESTHESIOLOGY

## 2024-05-31 PROCEDURE — 3700000000 HC ANESTHESIA ATTENDED CARE: Performed by: STUDENT IN AN ORGANIZED HEALTH CARE EDUCATION/TRAINING PROGRAM

## 2024-05-31 PROCEDURE — 7100000011 HC PHASE II RECOVERY - ADDTL 15 MIN: Performed by: STUDENT IN AN ORGANIZED HEALTH CARE EDUCATION/TRAINING PROGRAM

## 2024-05-31 PROCEDURE — 93005 ELECTROCARDIOGRAM TRACING: CPT | Performed by: ANESTHESIOLOGY

## 2024-05-31 PROCEDURE — 2580000003 HC RX 258: Performed by: ANESTHESIOLOGY

## 2024-05-31 PROCEDURE — 6360000002 HC RX W HCPCS: Performed by: REGISTERED NURSE

## 2024-05-31 DEVICE — CLIP
Type: IMPLANTABLE DEVICE | Site: TRANSVERSE COLON | Status: FUNCTIONAL
Brand: RESOLUTION 360™ ULTRA CLIP

## 2024-05-31 RX ORDER — HYDRALAZINE HYDROCHLORIDE 20 MG/ML
INJECTION INTRAMUSCULAR; INTRAVENOUS
Status: COMPLETED
Start: 2024-05-31 | End: 2024-05-31

## 2024-05-31 RX ORDER — SODIUM CHLORIDE, SODIUM LACTATE, POTASSIUM CHLORIDE, CALCIUM CHLORIDE 600; 310; 30; 20 MG/100ML; MG/100ML; MG/100ML; MG/100ML
INJECTION, SOLUTION INTRAVENOUS CONTINUOUS
Status: DISCONTINUED | OUTPATIENT
Start: 2024-05-31 | End: 2024-05-31 | Stop reason: HOSPADM

## 2024-05-31 RX ORDER — PROPOFOL 10 MG/ML
INJECTION, EMULSION INTRAVENOUS CONTINUOUS PRN
Status: DISCONTINUED | OUTPATIENT
Start: 2024-05-31 | End: 2024-05-31 | Stop reason: SDUPTHER

## 2024-05-31 RX ORDER — HYDRALAZINE HYDROCHLORIDE 20 MG/ML
10 INJECTION INTRAMUSCULAR; INTRAVENOUS ONCE
Status: COMPLETED | OUTPATIENT
Start: 2024-05-31 | End: 2024-05-31

## 2024-05-31 RX ORDER — SODIUM CHLORIDE 0.9 % (FLUSH) 0.9 %
5-40 SYRINGE (ML) INJECTION EVERY 12 HOURS SCHEDULED
Status: DISCONTINUED | OUTPATIENT
Start: 2024-05-31 | End: 2024-05-31 | Stop reason: HOSPADM

## 2024-05-31 RX ORDER — SODIUM CHLORIDE 0.9 % (FLUSH) 0.9 %
5-40 SYRINGE (ML) INJECTION PRN
Status: DISCONTINUED | OUTPATIENT
Start: 2024-05-31 | End: 2024-05-31 | Stop reason: HOSPADM

## 2024-05-31 RX ORDER — LIDOCAINE HYDROCHLORIDE 10 MG/ML
1 INJECTION, SOLUTION INFILTRATION; PERINEURAL
Status: DISCONTINUED | OUTPATIENT
Start: 2024-05-31 | End: 2024-05-31 | Stop reason: HOSPADM

## 2024-05-31 RX ORDER — SODIUM CHLORIDE 9 MG/ML
25 INJECTION, SOLUTION INTRAVENOUS PRN
Status: DISCONTINUED | OUTPATIENT
Start: 2024-05-31 | End: 2024-05-31 | Stop reason: HOSPADM

## 2024-05-31 RX ORDER — HYDRALAZINE HYDROCHLORIDE 20 MG/ML
5 INJECTION INTRAMUSCULAR; INTRAVENOUS ONCE
Status: COMPLETED | OUTPATIENT
Start: 2024-05-31 | End: 2024-05-31

## 2024-05-31 RX ORDER — SODIUM CHLORIDE 9 MG/ML
INJECTION, SOLUTION INTRAVENOUS PRN
Status: DISCONTINUED | OUTPATIENT
Start: 2024-05-31 | End: 2024-05-31 | Stop reason: HOSPADM

## 2024-05-31 RX ORDER — LIDOCAINE HYDROCHLORIDE 20 MG/ML
INJECTION, SOLUTION EPIDURAL; INFILTRATION; INTRACAUDAL; PERINEURAL PRN
Status: DISCONTINUED | OUTPATIENT
Start: 2024-05-31 | End: 2024-05-31 | Stop reason: SDUPTHER

## 2024-05-31 RX ADMIN — SODIUM CHLORIDE, SODIUM LACTATE, POTASSIUM CHLORIDE, AND CALCIUM CHLORIDE: 600; 310; 30; 20 INJECTION, SOLUTION INTRAVENOUS at 13:33

## 2024-05-31 RX ADMIN — HYDRALAZINE HYDROCHLORIDE 10 MG: 20 INJECTION INTRAMUSCULAR; INTRAVENOUS at 14:30

## 2024-05-31 RX ADMIN — LIDOCAINE HYDROCHLORIDE 50 MG: 20 INJECTION, SOLUTION EPIDURAL; INFILTRATION; INTRACAUDAL; PERINEURAL at 13:37

## 2024-05-31 RX ADMIN — HYDRALAZINE HYDROCHLORIDE 5 MG: 20 INJECTION INTRAMUSCULAR; INTRAVENOUS at 12:07

## 2024-05-31 RX ADMIN — PROPOFOL 50 MG: 10 INJECTION, EMULSION INTRAVENOUS at 13:40

## 2024-05-31 RX ADMIN — HYDRALAZINE HYDROCHLORIDE 5 MG: 20 INJECTION, SOLUTION INTRAMUSCULAR; INTRAVENOUS at 11:31

## 2024-05-31 RX ADMIN — PROPOFOL 140 MCG/KG/MIN: 10 INJECTION, EMULSION INTRAVENOUS at 13:37

## 2024-05-31 ASSESSMENT — COPD QUESTIONNAIRES: CAT_SEVERITY: MILD

## 2024-05-31 ASSESSMENT — LIFESTYLE VARIABLES: SMOKING_STATUS: 1

## 2024-05-31 ASSESSMENT — PAIN - FUNCTIONAL ASSESSMENT: PAIN_FUNCTIONAL_ASSESSMENT: NONE - DENIES PAIN

## 2024-05-31 NOTE — ANESTHESIA POSTPROCEDURE EVALUATION
Department of Anesthesiology  Postprocedure Note    Patient: MINH Valdes  MRN: [id number]  YOB: 1960  Date of evaluation: 5/31/2024    Procedure Summary       Date: 05/31/24 Room / Location: [facility] ENDO 04 / [facility] ENDOSCOPY    Anesthesia Start: 1333 Anesthesia Stop: 1358    Procedure: COLONOSCOPY POLYPECTOMY SNARE (HOT)/ Clip X 1 Diagnosis:       Encounter for screening colonoscopy      (Encounter for screening colonoscopy [Z12.11])    Surgeons: Javy Curry MD Responsible Provider: Yusuf Urrutia MD    Anesthesia Type: TIVA ASA Status: 3            Anesthesia Type: TIVA    Rosie Phase I: Rosie Score: 10    Rosie Phase II: Rosie Score: 10    Anesthesia Post Evaluation    Patient location during evaluation: PACU  Patient participation: complete - patient participated  Level of consciousness: awake and alert  Airway patency: patent  Nausea & Vomiting: no nausea and no vomiting  Cardiovascular status: hemodynamically stable  Respiratory status: acceptable, nonlabored ventilation and spontaneous ventilation  Hydration status: euvolemic  Comments: BP (!) 177/80 Comment: Dr. Urrutia notified of Bp Ok to discharge home. Pt instructed to take his blood pressure medicines when he gets home, pt and son verbalized understanding.  Pulse 93   Temp 97.7 °F (36.5 °C) (Temporal)   Resp 16   Ht 1.88 m (6' 2\")   Wt 100.7 kg (222 lb)   SpO2 100%   BMI 28.50 kg/m²   Appreciate cardiology consult, they are aware of his HR and BP.  He has a f/u appointment.  Multimodal analgesia pain management approach  Pain management: adequate and satisfactory to patient    No notable events documented.

## 2024-05-31 NOTE — OP NOTE
Operative Report    Patient: MINH Valdes MRN: [id number]      YOB: 1960  Age: 63 y.o.  Sex: male            Indications:  Screening    Preoperative Evaluation: The patient was evaluated prior to the procedure in the GI lab admission area, the patient ASA was recorded .  Consent was obtained from the patient with the risk of perforation bleeding and aspiration.    Anesthesia: DEISY-per anesthesia    Complications: None; patient tolerated the procedure well.    EBL -insignificant      Procedure: The patient was sedated in the left lateral decubitus position.  Scope was advanced from the rectum to the cecum.  Per gastroenterology society guideline recommendations, right side of the colon was examined twice. The scope was withdrawn to the rectum, retroflexed view was performed.  The rectal exam was normal.  Preparation was adequate. [place] score of 9 .    Findings:    1 large pedunculated polyp located in transverse colon, 15 mm in size, removed via hot snare polypectomy.  1 large pedunculated polyp located in the sigmoid colon, 25 mm in size, removed via hot snare polypectomy.  The defect was closed with 1 metal clip.  Mild sigmoid diverticulosis  Internal hemorrhoids    Postoperative Diagnosis:   2 polyps  Diverticulosis  Hemorrhoids    Recommendations:   Repeat colonoscopy in 1 year.  Await for biopsy results, you will receive a pathology letter within 2 weeks.     Signed By:  Javy Curry MD     May 31, 2024

## 2024-05-31 NOTE — MANAGEMENT PLAN
Called to bedside to review EKG and to ensure follow up for the patient post procedure today.  The patient has a hx of bradycardia and within his normal rates and EKG pattern.  He is asymptomatic today and at home.  He has follow up already scheduled with EP for possible ablation.  The patient will hold his BB for one day post procedure and continue on his home medications regiment unless otherwise stated by his primary team.      GORAN Monroe - CNP  05/31/24  1:23 PM

## 2024-05-31 NOTE — ANESTHESIA PRE PROCEDURE
Department of Anesthesiology  Preprocedure Note       Name:  MINH Valdes   Age:  63 y.o.  :  1960                                          MRN:  [id number]         Date:  2024      Surgeon: Surgeon(s):  Javy Curry MD    Procedure: Procedure(s):  COLORECTAL CANCER SCREENING, NOT HIGH RISK    Medications prior to admission:   Prior to Admission medications    Medication Sig Start Date End Date Taking? Authorizing Provider   gabapentin (NEURONTIN) 300 MG capsule Take 2 capsules by mouth 2 times daily for 90 days. Intended supply: 30 days 24  Josefa Perston APRN - CNP   cetirizine (ZYRTEC) 10 MG tablet Take 1 tablet by mouth daily    ProviderGovind MD   nicotine (NICODERM CQ) 14 MG/24HR Place 1 patch onto the skin daily for 14 days 24  Carmen Morrow MD   nicotine (NICODERM CQ) 7 MG/24HR Place 1 patch onto the skin every 24 hours  Patient not taking: Reported on 2024  Carmen Morrow MD   XARELTO 20 MG TABS tablet TAKE 1 TABLET BY MOUTH EVERY DAY WITH DINNER 3/28/24   Carmen Morrow MD   traZODone (DESYREL) 50 MG tablet Take 1 tablet by mouth nightly 24   Josefa Preston APRN - CNP   montelukast (SINGULAIR) 10 MG tablet Take 1 tablet by mouth daily 24   Josefa Preston APRN - CNP   fluticasone (FLONASE) 50 MCG/ACT nasal spray 2 sprays by Each Nostril route daily 24   Josefa Preston APRN - CNP   atorvastatin (LIPITOR) 20 MG tablet Take 1 tablet by mouth daily 24   Josefa Preston APRN - CNP   Metoprolol Succinate 100 MG CS24 Take by mouth    ProviderGovind MD   amiodarone (CORDARONE) 200 MG tablet Take 1 tablet by mouth daily 24   Herrera Brown APRN - CNP   valsartan (DIOVAN) 80 MG tablet TAKE 1 TABLET BY MOUTH EVERY DAY 23   Carmen Morrow MD   ketoconazole (NIZORAL) 2 % cream Apply topically daily Apply topically daily.  Patient not taking: Reported

## 2024-05-31 NOTE — DISCHARGE INSTRUCTIONS
Gastrointestinal Colonoscopy/Flexible Sigmoidoscopy - Lower Exam Discharge Instructions  Call Dr. Curry [phone] for any problems or questions.  Contact the doctor’s office for follow up appointment as directed  Medication may cause drowsiness for several hours, therefore, do not drive or operate machinery for remainder of the day.  No alcohol today.  Do not make any important decisions such signing legal paperwork.  Ordinarily, you may resume regular diet and activity after exam unless otherwise specified by your physician.  Because of air put into your colon during exam, you may experience some abdominal distension, relieved by the passage of gas, for several hours.  Contact your physician if you have any of the following:  Excessive amount of bleeding - large amount when having a bowel movement.  Occasional specks of blood with bowel movement would not be unusual.  Severe abdominal pain  Fever or Chills  Polyp Removal - follow these additional instructions  Soft diet for 24 hours, then resume regular diet   Take Metamucil - 1 tablespoon in juice every morning for 3 days, if needed.  No Aspirin, Advil, Aleve, Nuprin, Ibuprofen, or medications that contain these drugs for 2 weeks, unless taken for a heart condition.    Any additional instructions:   Repeat colonoscopy in 1 year.  Await for biopsy results, you will receive a pathology letter within 2 weeks.         Instructions given to MINH Valdes and other family members.

## 2024-06-03 ENCOUNTER — TELEPHONE (OUTPATIENT)
Age: 64
End: 2024-06-03

## 2024-06-03 ENCOUNTER — TELEPHONE (OUTPATIENT)
Dept: GASTROENTEROLOGY | Age: 64
End: 2024-06-03

## 2024-06-03 NOTE — TELEPHONE ENCOUNTER
Pt called in and states that he is having pronounced rectal bleeding that is present in large amounts in the toilet. Advised pt that we suggest being seen in the emergency department as they would be better able to provide treatment in this situation. Pt verbalized understanding.

## 2024-06-04 ENCOUNTER — HOSPITAL ENCOUNTER (INPATIENT)
Age: 64
LOS: 2 days | Discharge: HOME | DRG: 920 | End: 2024-06-06
Attending: INTERNAL MEDICINE | Admitting: FAMILY MEDICINE
Payer: MEDICARE

## 2024-06-04 ENCOUNTER — TELEPHONE (OUTPATIENT)
Dept: FAMILY MEDICINE CLINIC | Facility: CLINIC | Age: 64
End: 2024-06-04

## 2024-06-04 ENCOUNTER — HOSPITAL ENCOUNTER (EMERGENCY)
Age: 64
Discharge: ANOTHER HOSPITAL | End: 2024-06-04
Attending: EMERGENCY MEDICINE
Payer: MEDICARE

## 2024-06-04 ENCOUNTER — TELEPHONE (OUTPATIENT)
Dept: GASTROENTEROLOGY | Age: 64
End: 2024-06-04

## 2024-06-04 VITALS
SYSTOLIC BLOOD PRESSURE: 144 MMHG | HEART RATE: 56 BPM | DIASTOLIC BLOOD PRESSURE: 61 MMHG | TEMPERATURE: 98.6 F | OXYGEN SATURATION: 99 % | RESPIRATION RATE: 14 BRPM

## 2024-06-04 DIAGNOSIS — K92.2 LOWER GI BLEED: Primary | ICD-10-CM

## 2024-06-04 DIAGNOSIS — D64.9 SYMPTOMATIC ANEMIA: ICD-10-CM

## 2024-06-04 LAB
ALBUMIN SERPL-MCNC: 3.2 G/DL (ref 3.2–4.6)
ALBUMIN/GLOB SERPL: 1.4 (ref 0.4–1.6)
ALP SERPL-CCNC: 70 U/L (ref 45–117)
ALT SERPL-CCNC: 6 U/L (ref 13–61)
ANION GAP SERPL CALC-SCNC: 12 MMOL/L (ref 2–11)
AST SERPL-CCNC: 15 U/L (ref 15–37)
BASOPHILS # BLD: 0 K/UL (ref 0–0.2)
BASOPHILS NFR BLD: 0 % (ref 0–2)
BILIRUB SERPL-MCNC: <0.2 MG/DL (ref 0.2–1.1)
BUN SERPL-MCNC: 24 MG/DL (ref 8–23)
CALCIUM SERPL-MCNC: 8.1 MG/DL (ref 8.3–10.4)
CHLORIDE SERPL-SCNC: 107 MMOL/L (ref 98–107)
CO2 SERPL-SCNC: 20 MMOL/L (ref 21–32)
CREAT SERPL-MCNC: 1.8 MG/DL (ref 0.8–1.5)
DIFFERENTIAL METHOD BLD: ABNORMAL
EKG ATRIAL RATE: 68 BPM
EKG DIAGNOSIS: NORMAL
EKG P AXIS: 51 DEGREES
EKG P-R INTERVAL: 188 MS
EKG Q-T INTERVAL: 440 MS
EKG QRS DURATION: 94 MS
EKG QTC CALCULATION (BAZETT): 467 MS
EKG T AXIS: 30 DEGREES
EKG VENTRICULAR RATE: 68 BPM
EOSINOPHIL # BLD: 0.2 K/UL (ref 0–0.8)
EOSINOPHIL NFR BLD: 2 % (ref 0.5–7.8)
ERYTHROCYTE [DISTWIDTH] IN BLOOD BY AUTOMATED COUNT: 15.1 % (ref 11.9–14.6)
GLOBULIN SER CALC-MCNC: 2.3 G/DL (ref 2.8–4.5)
GLUCOSE SERPL-MCNC: 131 MG/DL (ref 65–100)
HCT VFR BLD AUTO: 17 % (ref 41.1–50.3)
HGB BLD-MCNC: 5.6 G/DL (ref 13.6–17.2)
HISTORY CHECK: NORMAL
IMM GRANULOCYTES # BLD AUTO: 0.1 K/UL (ref 0–0.5)
IMM GRANULOCYTES NFR BLD AUTO: 1 % (ref 0–5)
INR PPP: 1.7
LYMPHOCYTES # BLD: 3.4 K/UL (ref 0.5–4.6)
MCH RBC QN AUTO: 32 PG (ref 26.1–32.9)
MCV RBC AUTO: 97.1 FL (ref 82–102)
MONOCYTES # BLD: 1 K/UL (ref 0.1–1.3)
NEUTS SEG # BLD: 5.6 K/UL (ref 1.7–8.2)
NEUTS SEG NFR BLD: 54 % (ref 43–78)
NRBC # BLD: 0.02 K/UL (ref 0–0.2)
PLATELET # BLD AUTO: 188 K/UL (ref 150–450)
POTASSIUM SERPL-SCNC: 4.3 MMOL/L (ref 3.5–5.1)
PROT SERPL-MCNC: 5.5 G/DL (ref 6.4–8.2)
PROTHROMBIN TIME: 20.2 SEC (ref 11.3–14.9)
RBC # BLD AUTO: 1.75 M/UL (ref 4.23–5.6)
WBC # BLD AUTO: 10.3 K/UL (ref 4.3–11.1)

## 2024-06-04 PROCEDURE — 86850 RBC ANTIBODY SCREEN: CPT

## 2024-06-04 PROCEDURE — 36430 TRANSFUSION BLD/BLD COMPNT: CPT

## 2024-06-04 PROCEDURE — 96374 THER/PROPH/DIAG INJ IV PUSH: CPT

## 2024-06-04 PROCEDURE — C9113 INJ PANTOPRAZOLE SODIUM, VIA: HCPCS | Performed by: EMERGENCY MEDICINE

## 2024-06-04 PROCEDURE — 6360000002 HC RX W HCPCS: Performed by: EMERGENCY MEDICINE

## 2024-06-04 PROCEDURE — 85610 PROTHROMBIN TIME: CPT

## 2024-06-04 PROCEDURE — P9016 RBC LEUKOCYTES REDUCED: HCPCS

## 2024-06-04 PROCEDURE — 86923 COMPATIBILITY TEST ELECTRIC: CPT

## 2024-06-04 PROCEDURE — 93010 ELECTROCARDIOGRAM REPORT: CPT | Performed by: INTERNAL MEDICINE

## 2024-06-04 PROCEDURE — 85025 COMPLETE CBC W/AUTO DIFF WBC: CPT

## 2024-06-04 PROCEDURE — 1100000000 HC RM PRIVATE

## 2024-06-04 PROCEDURE — 36415 COLL VENOUS BLD VENIPUNCTURE: CPT

## 2024-06-04 PROCEDURE — 99285 EMERGENCY DEPT VISIT HI MDM: CPT

## 2024-06-04 PROCEDURE — 30233N1 TRANSFUSION OF NONAUTOLOGOUS RED BLOOD CELLS INTO PERIPHERAL VEIN, PERCUTANEOUS APPROACH: ICD-10-PCS | Performed by: INTERNAL MEDICINE

## 2024-06-04 PROCEDURE — 80053 COMPREHEN METABOLIC PANEL: CPT

## 2024-06-04 PROCEDURE — 86900 BLOOD TYPING SEROLOGIC ABO: CPT

## 2024-06-04 PROCEDURE — 93005 ELECTROCARDIOGRAM TRACING: CPT | Performed by: EMERGENCY MEDICINE

## 2024-06-04 PROCEDURE — 2580000003 HC RX 258: Performed by: EMERGENCY MEDICINE

## 2024-06-04 PROCEDURE — 86901 BLOOD TYPING SEROLOGIC RH(D): CPT

## 2024-06-04 RX ORDER — 0.9 % SODIUM CHLORIDE 0.9 %
1000 INTRAVENOUS SOLUTION INTRAVENOUS ONCE
Status: COMPLETED | OUTPATIENT
Start: 2024-06-04 | End: 2024-06-04

## 2024-06-04 RX ORDER — FLUTICASONE PROPIONATE 50 MCG
2 SPRAY, SUSPENSION (ML) NASAL DAILY
Status: DISCONTINUED | OUTPATIENT
Start: 2024-06-05 | End: 2024-06-06 | Stop reason: HOSPADM

## 2024-06-04 RX ORDER — FAMOTIDINE 20 MG/1
10 TABLET, FILM COATED ORAL DAILY PRN
Status: DISCONTINUED | OUTPATIENT
Start: 2024-06-04 | End: 2024-06-06 | Stop reason: HOSPADM

## 2024-06-04 RX ORDER — MAGNESIUM HYDROXIDE/ALUMINUM HYDROXICE/SIMETHICONE 120; 1200; 1200 MG/30ML; MG/30ML; MG/30ML
30 SUSPENSION ORAL EVERY 6 HOURS PRN
Status: DISCONTINUED | OUTPATIENT
Start: 2024-06-04 | End: 2024-06-06 | Stop reason: HOSPADM

## 2024-06-04 RX ORDER — BISACODYL 10 MG
10 SUPPOSITORY, RECTAL RECTAL DAILY PRN
Status: DISCONTINUED | OUTPATIENT
Start: 2024-06-04 | End: 2024-06-06 | Stop reason: HOSPADM

## 2024-06-04 RX ORDER — TRAZODONE HYDROCHLORIDE 50 MG/1
50 TABLET ORAL NIGHTLY
Status: DISCONTINUED | OUTPATIENT
Start: 2024-06-04 | End: 2024-06-06 | Stop reason: HOSPADM

## 2024-06-04 RX ORDER — SODIUM CHLORIDE 9 MG/ML
INJECTION, SOLUTION INTRAVENOUS PRN
Status: DISCONTINUED | OUTPATIENT
Start: 2024-06-04 | End: 2024-06-06 | Stop reason: HOSPADM

## 2024-06-04 RX ORDER — POTASSIUM CHLORIDE 7.45 MG/ML
10 INJECTION INTRAVENOUS PRN
Status: DISCONTINUED | OUTPATIENT
Start: 2024-06-04 | End: 2024-06-06 | Stop reason: HOSPADM

## 2024-06-04 RX ORDER — ACETAMINOPHEN 325 MG/1
650 TABLET ORAL EVERY 6 HOURS PRN
Status: DISCONTINUED | OUTPATIENT
Start: 2024-06-04 | End: 2024-06-06 | Stop reason: HOSPADM

## 2024-06-04 RX ORDER — SODIUM CHLORIDE 0.9 % (FLUSH) 0.9 %
5-40 SYRINGE (ML) INJECTION EVERY 12 HOURS SCHEDULED
Status: DISCONTINUED | OUTPATIENT
Start: 2024-06-04 | End: 2024-06-06 | Stop reason: HOSPADM

## 2024-06-04 RX ORDER — ACETAMINOPHEN 650 MG/1
650 SUPPOSITORY RECTAL EVERY 6 HOURS PRN
Status: DISCONTINUED | OUTPATIENT
Start: 2024-06-04 | End: 2024-06-06 | Stop reason: HOSPADM

## 2024-06-04 RX ORDER — MONTELUKAST SODIUM 10 MG/1
10 TABLET ORAL DAILY
Status: DISCONTINUED | OUTPATIENT
Start: 2024-06-04 | End: 2024-06-06 | Stop reason: HOSPADM

## 2024-06-04 RX ORDER — ONDANSETRON 4 MG/1
4 TABLET, ORALLY DISINTEGRATING ORAL EVERY 8 HOURS PRN
Status: DISCONTINUED | OUTPATIENT
Start: 2024-06-04 | End: 2024-06-04

## 2024-06-04 RX ORDER — POLYETHYLENE GLYCOL 3350 17 G/17G
17 POWDER, FOR SOLUTION ORAL DAILY PRN
Status: DISCONTINUED | OUTPATIENT
Start: 2024-06-04 | End: 2024-06-06 | Stop reason: HOSPADM

## 2024-06-04 RX ORDER — PANTOPRAZOLE SODIUM 40 MG/1
40 TABLET, DELAYED RELEASE ORAL
Status: DISCONTINUED | OUTPATIENT
Start: 2024-06-05 | End: 2024-06-06 | Stop reason: HOSPADM

## 2024-06-04 RX ORDER — CETIRIZINE HYDROCHLORIDE 10 MG/1
10 TABLET ORAL DAILY
Status: DISCONTINUED | OUTPATIENT
Start: 2024-06-05 | End: 2024-06-06 | Stop reason: HOSPADM

## 2024-06-04 RX ORDER — NICOTINE 21 MG/24HR
1 PATCH, TRANSDERMAL 24 HOURS TRANSDERMAL DAILY
Status: DISCONTINUED | OUTPATIENT
Start: 2024-06-04 | End: 2024-06-06 | Stop reason: HOSPADM

## 2024-06-04 RX ORDER — ATORVASTATIN CALCIUM 10 MG/1
20 TABLET, FILM COATED ORAL DAILY
Status: DISCONTINUED | OUTPATIENT
Start: 2024-06-05 | End: 2024-06-06 | Stop reason: HOSPADM

## 2024-06-04 RX ORDER — ONDANSETRON 2 MG/ML
4 INJECTION INTRAMUSCULAR; INTRAVENOUS EVERY 6 HOURS PRN
Status: DISCONTINUED | OUTPATIENT
Start: 2024-06-04 | End: 2024-06-04

## 2024-06-04 RX ORDER — POTASSIUM CHLORIDE 20 MEQ/1
40 TABLET, EXTENDED RELEASE ORAL PRN
Status: DISCONTINUED | OUTPATIENT
Start: 2024-06-04 | End: 2024-06-06 | Stop reason: HOSPADM

## 2024-06-04 RX ORDER — SODIUM CHLORIDE 0.9 % (FLUSH) 0.9 %
5-40 SYRINGE (ML) INJECTION PRN
Status: DISCONTINUED | OUTPATIENT
Start: 2024-06-04 | End: 2024-06-06 | Stop reason: HOSPADM

## 2024-06-04 RX ORDER — SODIUM CHLORIDE 9 MG/ML
INJECTION, SOLUTION INTRAVENOUS PRN
Status: DISCONTINUED | OUTPATIENT
Start: 2024-06-04 | End: 2024-06-05

## 2024-06-04 RX ORDER — MAGNESIUM SULFATE IN WATER 40 MG/ML
2000 INJECTION, SOLUTION INTRAVENOUS PRN
Status: DISCONTINUED | OUTPATIENT
Start: 2024-06-04 | End: 2024-06-06 | Stop reason: HOSPADM

## 2024-06-04 RX ORDER — AMIODARONE HYDROCHLORIDE 200 MG/1
200 TABLET ORAL DAILY
Status: DISCONTINUED | OUTPATIENT
Start: 2024-06-05 | End: 2024-06-06 | Stop reason: HOSPADM

## 2024-06-04 RX ADMIN — SODIUM CHLORIDE 1000 ML: 9 INJECTION, SOLUTION INTRAVENOUS at 17:53

## 2024-06-04 RX ADMIN — SODIUM CHLORIDE, PRESERVATIVE FREE 40 MG: 5 INJECTION INTRAVENOUS at 17:57

## 2024-06-04 ASSESSMENT — ENCOUNTER SYMPTOMS
VOMITING: 0
RHINORRHEA: 0
EYE DISCHARGE: 0
DIARRHEA: 0
BACK PAIN: 0
NAUSEA: 0
COLOR CHANGE: 0
FACIAL SWELLING: 0
ABDOMINAL PAIN: 0
BLOOD IN STOOL: 1
EYE REDNESS: 0
COUGH: 0
SHORTNESS OF BREATH: 1

## 2024-06-04 NOTE — ED NOTES
TRANSFER - OUT REPORT:    Verbal report given to Diane on JW Valdes  being transferred to [facility] for routine progression of patient care       Report consisted of patient's Situation, Background, Assessment and   Recommendations(SBAR).     Information from the following report(s) Nurse Handoff Report, ED Encounter Summary, ED SBAR, MAR, Neuro Assessment, and Event Log was reviewed with the receiving nurse.    Kinder Fall Assessment:                           Lines:   Peripheral IV 06/04/24 Left Antecubital (Active)        Opportunity for questions and clarification was provided.      Patient transported with:  Monitor

## 2024-06-04 NOTE — TELEPHONE ENCOUNTER
Patient requested a b/p check to compare with his b/p machine at home. He feels they may not be correct. He is scheduled for tomorrow at 10:00 on the nurse schedule.

## 2024-06-04 NOTE — ED TRIAGE NOTES
Pt arrives stating he gets dizzy while walking and fatigue. Pt states symptoms started yesterday and mimic when he had a heart problem

## 2024-06-04 NOTE — ED PROVIDER NOTES
Extraocular movements intact.      Comments: Pale conjunctiva   Cardiovascular:      Rate and Rhythm: Normal rate and regular rhythm.   Pulmonary:      Effort: Pulmonary effort is normal. No respiratory distress.      Breath sounds: No wheezing or rales.   Abdominal:      General: Abdomen is flat.      Palpations: Abdomen is soft.      Tenderness: There is no abdominal tenderness. There is no guarding or rebound.   Musculoskeletal:         General: No swelling, tenderness or signs of injury. Normal range of motion.      Cervical back: Normal range of motion and neck supple. No rigidity.   Skin:     General: Skin is warm and dry.      Coloration: Skin is not jaundiced or pale.   Neurological:      General: No focal deficit present.      Mental Status: He is alert and oriented to person, place, and time.      Gait: Gait normal.   Psychiatric:         Mood and Affect: Mood normal.         Behavior: Behavior normal.        Procedures     EKG 12 Lead    Date/Time: 6/4/2024 5:40 PM    Performed by: Emeka Linares MD  Authorized by: Emeka Linares MD    ECG interpreted by ED Physician in the absence of a cardiologist: yes    Previous ECG:     Previous ECG:  Compared to current    Similarity:  Changes noted  Rate:     ECG rate:  68    ECG rate assessment: normal    Rhythm:     Rhythm: sinus rhythm    Ectopy:     Ectopy: none    QRS:     QRS axis:  Normal    QRS intervals:  Normal    QRS conduction: normal    ST segments:     ST segments:  Non-specific  T waves:     T waves: non-specific    Other findings:     Other findings: prolonged qTc interval        Orders Placed This Encounter   Procedures    CBC with Auto Differential    Comprehensive Metabolic Panel    Protime-INR    EKG 12 Lead        Medications given during this emergency department visit:  Medications   sodium chloride 0.9 % bolus 1,000 mL (1,000 mLs IntraVENous New Bag 6/4/24 8149)   pantoprazole (PROTONIX) 40 mg in sodium chloride (PF) 0.9 % 10 mL

## 2024-06-05 PROBLEM — D62 ABLA (ACUTE BLOOD LOSS ANEMIA): Status: ACTIVE | Noted: 2024-06-05

## 2024-06-05 PROBLEM — K92.1 HEMATOCHEZIA: Status: ACTIVE | Noted: 2024-06-05

## 2024-06-05 PROBLEM — I42.8 TACHYCARDIA INDUCED CARDIOMYOPATHY (HCC): Chronic | Status: ACTIVE | Noted: 2023-12-20

## 2024-06-05 PROBLEM — I48.0 PAROXYSMAL ATRIAL FIBRILLATION (HCC): Chronic | Status: ACTIVE | Noted: 2024-04-05

## 2024-06-05 PROBLEM — I10 ESSENTIAL HYPERTENSION: Chronic | Status: ACTIVE | Noted: 2020-10-23

## 2024-06-05 PROBLEM — N18.32 STAGE 3B CHRONIC KIDNEY DISEASE (HCC): Chronic | Status: ACTIVE | Noted: 2023-01-10

## 2024-06-05 PROBLEM — E78.2 MIXED HYPERLIPIDEMIA: Chronic | Status: ACTIVE | Noted: 2020-10-23

## 2024-06-05 PROBLEM — G62.9 PERIPHERAL NEUROPATHY: Chronic | Status: ACTIVE | Noted: 2020-10-23

## 2024-06-05 PROBLEM — R00.0 TACHYCARDIA INDUCED CARDIOMYOPATHY (HCC): Chronic | Status: ACTIVE | Noted: 2023-12-20

## 2024-06-05 PROBLEM — I43 TACHYCARDIA INDUCED CARDIOMYOPATHY (HCC): Chronic | Status: ACTIVE | Noted: 2023-12-20

## 2024-06-05 PROBLEM — Z72.0 TOBACCO USE: Chronic | Status: ACTIVE | Noted: 2022-05-02

## 2024-06-05 PROBLEM — K91.840 COLONOSCOPY CAUSING POST-PROCEDURAL BLEEDING: Status: ACTIVE | Noted: 2024-06-05

## 2024-06-05 LAB
ANION GAP SERPL CALC-SCNC: 6 MMOL/L (ref 9–18)
BUN SERPL-MCNC: 18 MG/DL (ref 8–23)
CALCIUM SERPL-MCNC: 7.9 MG/DL (ref 8.8–10.2)
CHLORIDE SERPL-SCNC: 111 MMOL/L (ref 98–107)
CO2 SERPL-SCNC: 23 MMOL/L (ref 20–28)
CREAT SERPL-MCNC: 1.42 MG/DL (ref 0.8–1.3)
FERRITIN SERPL-MCNC: 41 NG/ML (ref 8–388)
GLUCOSE SERPL-MCNC: 86 MG/DL (ref 70–99)
HCT VFR BLD AUTO: 21.2 % (ref 41.1–50.3)
HCT VFR BLD AUTO: 28.6 % (ref 41.1–50.3)
HGB BLD-MCNC: 6.8 G/DL (ref 13.6–17.2)
HGB BLD-MCNC: 9.3 G/DL (ref 13.6–17.2)
HISTORY CHECK: NORMAL
IRON SATN MFR SERPL: 23 % (ref 20–50)
IRON SERPL-MCNC: 52 UG/DL (ref 35–100)
POTASSIUM SERPL-SCNC: 4.1 MMOL/L (ref 3.5–5.1)
SODIUM SERPL-SCNC: 139 MMOL/L (ref 136–145)
TIBC SERPL-MCNC: 227 UG/DL (ref 240–450)
UIBC SERPL-MCNC: 175 UG/DL (ref 112–347)

## 2024-06-05 PROCEDURE — 1100000000 HC RM PRIVATE

## 2024-06-05 PROCEDURE — 6370000000 HC RX 637 (ALT 250 FOR IP): Performed by: FAMILY MEDICINE

## 2024-06-05 PROCEDURE — 80048 BASIC METABOLIC PNL TOTAL CA: CPT

## 2024-06-05 PROCEDURE — 85018 HEMOGLOBIN: CPT

## 2024-06-05 PROCEDURE — 85014 HEMATOCRIT: CPT

## 2024-06-05 PROCEDURE — 99223 1ST HOSP IP/OBS HIGH 75: CPT | Performed by: PHYSICIAN ASSISTANT

## 2024-06-05 PROCEDURE — 83540 ASSAY OF IRON: CPT

## 2024-06-05 PROCEDURE — 6370000000 HC RX 637 (ALT 250 FOR IP): Performed by: INTERNAL MEDICINE

## 2024-06-05 PROCEDURE — 2580000003 HC RX 258: Performed by: FAMILY MEDICINE

## 2024-06-05 PROCEDURE — 83550 IRON BINDING TEST: CPT

## 2024-06-05 PROCEDURE — 82728 ASSAY OF FERRITIN: CPT

## 2024-06-05 PROCEDURE — 36415 COLL VENOUS BLD VENIPUNCTURE: CPT

## 2024-06-05 PROCEDURE — P9016 RBC LEUKOCYTES REDUCED: HCPCS

## 2024-06-05 PROCEDURE — 36430 TRANSFUSION BLD/BLD COMPNT: CPT

## 2024-06-05 RX ORDER — HYDRALAZINE HYDROCHLORIDE 20 MG/ML
20 INJECTION INTRAMUSCULAR; INTRAVENOUS EVERY 4 HOURS PRN
Status: DISCONTINUED | OUTPATIENT
Start: 2024-06-05 | End: 2024-06-06 | Stop reason: HOSPADM

## 2024-06-05 RX ORDER — SODIUM CHLORIDE 9 MG/ML
INJECTION, SOLUTION INTRAVENOUS PRN
Status: DISCONTINUED | OUTPATIENT
Start: 2024-06-05 | End: 2024-06-06 | Stop reason: HOSPADM

## 2024-06-05 RX ORDER — VALSARTAN 80 MG/1
80 TABLET ORAL DAILY
Status: DISCONTINUED | OUTPATIENT
Start: 2024-06-05 | End: 2024-06-06

## 2024-06-05 RX ADMIN — MONTELUKAST 10 MG: 10 TABLET, FILM COATED ORAL at 00:07

## 2024-06-05 RX ADMIN — SODIUM CHLORIDE, PRESERVATIVE FREE 10 ML: 5 INJECTION INTRAVENOUS at 20:47

## 2024-06-05 RX ADMIN — TRAZODONE HYDROCHLORIDE 50 MG: 50 TABLET ORAL at 20:46

## 2024-06-05 RX ADMIN — PANTOPRAZOLE SODIUM 40 MG: 40 TABLET, DELAYED RELEASE ORAL at 05:36

## 2024-06-05 RX ADMIN — VALSARTAN 80 MG: 80 TABLET ORAL at 14:15

## 2024-06-05 RX ADMIN — AMIODARONE HYDROCHLORIDE 200 MG: 200 TABLET ORAL at 08:29

## 2024-06-05 RX ADMIN — ATORVASTATIN CALCIUM 20 MG: 10 TABLET, FILM COATED ORAL at 08:28

## 2024-06-05 RX ADMIN — FLUTICASONE PROPIONATE 2 SPRAY: 50 SPRAY, METERED NASAL at 08:27

## 2024-06-05 RX ADMIN — CETIRIZINE HYDROCHLORIDE 10 MG: 10 TABLET, FILM COATED ORAL at 08:28

## 2024-06-05 RX ADMIN — SODIUM CHLORIDE, PRESERVATIVE FREE 10 ML: 5 INJECTION INTRAVENOUS at 08:33

## 2024-06-05 RX ADMIN — SODIUM CHLORIDE, PRESERVATIVE FREE 10 ML: 5 INJECTION INTRAVENOUS at 00:07

## 2024-06-05 RX ADMIN — MONTELUKAST 10 MG: 10 TABLET, FILM COATED ORAL at 08:29

## 2024-06-05 RX ADMIN — TRAZODONE HYDROCHLORIDE 50 MG: 50 TABLET ORAL at 00:07

## 2024-06-05 ASSESSMENT — PAIN SCALES - GENERAL: PAINLEVEL_OUTOF10: 0

## 2024-06-05 NOTE — PROGRESS NOTES
4 Eyes Skin Assessment     NAME:  MINH Valdes  YOB: 1960  MEDICAL RECORD NUMBER:  [id number]    The patient is being assessed for  Admission    I agree that at least one RN has performed a thorough Head to Toe Skin Assessment on the patient. ALL assessment sites listed below have been assessed.      Areas assessed by both nurses:    Head, Face, Ears, Shoulders, Back, Chest, Arms, Elbows, Hands, Sacrum. Buttock, Coccyx, Ischium, and Legs. Feet and Heels        Does the Patient have a Wound? No noted wound(s)       Geovani Prevention initiated by RN: Yes  Wound Care Orders initiated by RN: No    Pressure Injury (Stage 3,4, Unstageable, DTI, NWPT, and Complex wounds) if present, place Wound referral order by RN under : No    New Ostomies, if present place, Ostomy referral order under : No     Nurse 1 eSignature: Electronically signed by Mavis Treviño RN on 6/5/24 at 12:57 AM EDT    **SHARE this note so that the co-signing nurse can place an eSignature**    Nurse 2 eSignature: Electronically signed by Diane Hurd RN on 6/5/24 at 1:05 AM EDT

## 2024-06-05 NOTE — PROGRESS NOTES
Hospitalist Progress Note   Admit Date:  2024  7:51 PM   Name:  MINH Valdes   Age:  63 y.o.  Sex:  male  :  1960   MRN:  [id number]   Room:  [facility]    Presenting/Chief Complaint: No chief complaint on file.     Reason(s) for Admission: Lower GI bleed [K92.2]     Hospital Course:   MINH Valdes is a 63 y.o. male Hx of p a fib on Xarelto, CKD 3, HTN, HLD, HFrEF EF40%, who presented to the ED for cc persistent bright red bleeding per rectum since . Had a colonoscopy on May 31st in which some polyps were removed. Hx of p a fib on Xarelto. Also on ASA. Hg found to be 5.6 from baseline 12. Hg was 12.6 on 24     Admitted for post polypectomy bleeding caused by xarelto.  Xarelto and ASA held.  Given 3u RBC.        Subjective & 24hr Events:   Pt says he has had a normal brown stool.  No bleeding noted.  No abd pain or other complaints.  Hb still critically low despite 2u RBC.      Assessment & Plan:       ABLA (acute blood loss anemia)  -hb still critically low with active bleeding.  tranfuse another 1u pRBC today  -serial Hb  -check hb after any bloody BM  -recheck iron studies      Lower GI bleed    Hematochezia    Colonoscopy causing post-procedural bleeding  -full liquid diet today.  Advance tomorrow if Hb ok  -appreciate GI recs.  No plans for repeat endoscopy at this time      Bradycardia  -asymptomatic  -asked nursing to update home med list.  Unclear if really on metoprolol at  home.      Stage 3b chronic kidney disease (HCC)  -Cr at baseline  -daily BMP      Essential hypertension  -uncontrolled.  resume home valsartan      Paroxysmal atrial fibrillation (HCC)  -cont amiodarone  -holding AC      Anticipated Discharge Arrangements:   Home    PT/OT evals ordered?  Not ordered; patient not expected to need rehab  Diet:  ADULT DIET; Full Liquid  VTE prophylaxis: SCD's   Code status: Full Code      Non-peripheral Lines and Tubes (if present):          Telemetry (if present):  Cardiac/Telemetry

## 2024-06-05 NOTE — H&P
Hospitalist History and Physical   Admit Date:  2024  7:51 PM   Name:  MINH Valdes   Age:  63 y.o.  Sex:  male  :  1960   MRN:  [id number]   Room:  [facility]    Presenting/Chief Complaint: No chief complaint on file.     Reason(s) for Admission: Lower GI bleed [K92.2]     History of Present Illness:   MINH Valdes is a 63 y.o. male who presented to the ED for cc persistent bright red bleeding per rectum since . Had a colonoscopy on May 31st in which some polyps were removed. Hx of p a fib on Xarelto. Also on ASA. Hg found to be 5.6 from baseline 12. Hg was 12.6 on 24    Hx of p a fib on Xarelto, CKD 3, HTN, HLD, HFrEF EF40%  Assessment & Plan:     Active Problems:    Lower GI bleeding - Give 2 units PRBC. Remote tele. Trend Hg. Holding Xarelto and ASA. Consult GI to see in AM. Clears today and NPO past midnight.     A fib - Holding Xarelto. Amiodarone.     HTN - Holding BP medications due to active volume loss    HfrEF - remote tele. Strict Is and Os.     Nicotine patch    Neuropathy - Holding gabapentin since BP noted to be low prior to transport from [facility].     Trazodone    PPI      PT/OT evals ordered?  Not ordered; patient not expected to need rehab  Diet: ADULT DIET; Full Liquid  VTE prophylaxis: SCD's   Code status: Full Code      Non-peripheral Lines and Tubes (if present):             Hospital Problems:  Principal Problem:    ABLA (acute blood loss anemia)  Active Problems:    Stage 3b chronic kidney disease (HCC)    Essential hypertension    Mixed hyperlipidemia    Peripheral neuropathy    Tobacco use    Tachycardia induced cardiomyopathy (HCC)    Paroxysmal atrial fibrillation (HCC)    Lower GI bleed    Hematochezia    Colonoscopy causing post-procedural bleeding  Resolved Problems:    * No resolved hospital problems. *        Objective:   Patient Vitals for the past 24 hrs:   Temp Pulse Resp BP SpO2   24 1155 97.8 °F (36.6 °C) 50 18 (!) 155/75 99 %   24 0910 98.5 
Granulocytes Absolute 0.1 0.0 - 0.5 K/UL   Comprehensive Metabolic Panel    Collection Time: 06/04/24  5:28 PM   Result Value Ref Range    Sodium 139 133 - 143 mmol/L    Potassium 4.3 3.5 - 5.1 mmol/L    Chloride 107 98 - 107 mmol/L    CO2 20 (L) 21 - 32 mmol/L    Anion Gap 12 (H) 2 - 11 mmol/L    Glucose 131 (H) 65 - 100 mg/dL    BUN 24 (H) 8 - 23 MG/DL    Creatinine 1.80 (H) 0.8 - 1.5 MG/DL    Est, Glom Filt Rate 42 (L) >60 ml/min/1.73m2    Calcium 8.1 (L) 8.3 - 10.4 MG/DL    Total Bilirubin <0.2 (L) 0.2 - 1.1 MG/DL    ALT 6 (L) 13.0 - 61.0 U/L    AST 15 15 - 37 U/L    Alk Phosphatase 70 45.0 - 117.0 U/L    Total Protein 5.5 (L) 6.4 - 8.2 g/dL    Albumin 3.2 3.2 - 4.6 g/dL    Globulin 2.3 (L) 2.8 - 4.5 g/dL    Albumin/Globulin Ratio 1.4 0.4 - 1.6     EKG 12 Lead    Collection Time: 06/04/24  5:40 PM   Result Value Ref Range    Ventricular Rate 68 BPM    Atrial Rate 68 BPM    P-R Interval 188 ms    QRS Duration 94 ms    Q-T Interval 440 ms    QTc Calculation (Bazett) 467 ms    P Axis 51 degrees    R Axis 20 degrees    T Axis 30 degrees    Diagnosis       Normal sinus rhythm  T wave abnormality, consider lateral ischemia  Prolonged QT  Abnormal ECG  When compared with ECG of 31-MAY-2024 11:26,  Vent. rate has increased BY  22 BPM  Inverted T waves have replaced nonspecific T wave abnormality in Lateral   leads  QT has lengthened     Protime-INR    Collection Time: 06/04/24  5:55 PM   Result Value Ref Range    Protime 20.2 (H) 11.3 - 14.9 sec    INR 1.7         No results for input(s): \"COVID19\" in the last 72 hours.    No results found.      Signed:  FAITH ADAMS DO    Part of this note may have been written by using a voice dictation software.  The note has been proof read but may still contain some grammatical/other typographical errors.

## 2024-06-05 NOTE — CARE COORDINATION
CM met with Mr. Valdes in room 610 to discuss discharge planning. He is inpatient status for lower GI bleed.      Prior to admit, he was independent with ADLs, living with his sister at their mom's house (she  2023; his brother  2023).  At discharge, plan is home, no additional discharge needs voiced or identified.  CM available if needed.       24 5767   Service Assessment   Patient Orientation Alert and Oriented   Cognition Alert   History Provided By Patient   Support Systems Family Members   PCP Verified by CM Yes   Prior Functional Level Independent in ADLs/IADLs   Current Functional Level Independent in ADLs/IADLs   Can patient return to prior living arrangement Yes   Ability to make needs known: Good   Family able to assist with home care needs: Yes   Would you like for me to discuss the discharge plan with any other family members/significant others, and if so, who? No   Condition of Participation: Discharge Planning   The Plan for Transition of Care is related to the following treatment goals: home when stable

## 2024-06-05 NOTE — PROGRESS NOTES
TRANSFER - IN REPORT:    Verbal report received from MIKE Fong on JW Valdes  being received from [facility] for routine progression of patient care      Report consisted of patient's Situation, Background, Assessment and   Recommendations(SBAR).     Information from the following report(s) Nurse Handoff Report, ED SBAR, Adult Overview, Intake/Output, MAR, and Recent Results was reviewed with the receiving nurse.    Opportunity for questions and clarification was provided.      Assessment completed upon patient's arrival to unit and care assumed.

## 2024-06-05 NOTE — PLAN OF CARE
Problem: Discharge Planning  Goal: Discharge to home or other facility with appropriate resources  6/5/2024 1654 by Aaliyah Moreira RN  Outcome: Progressing  6/5/2024 0548 by Mavis Treviño RN  Outcome: Progressing     Problem: Safety - Adult  Goal: Free from fall injury  6/5/2024 1654 by Aaliyah Moreira RN  Outcome: Progressing  6/5/2024 0548 by Mavis Treviño RN  Outcome: Progressing     Problem: ABCDS Injury Assessment  Goal: Absence of physical injury  6/5/2024 1654 by Aaliyah Moreira RN  Outcome: Progressing  6/5/2024 0548 by Mavis Treviño RN  Outcome: Progressing

## 2024-06-05 NOTE — PROGRESS NOTES
Pt resting in bed at present. Dillard draining clear, yellow urine to gravity. Hgb 6.8 this morning, 1 unit PRBCs infused and f/u Hgb was 9.3. Diet advanced to regular, pt tolerating well. No needs voiced at this time.

## 2024-06-05 NOTE — CONSULTS
Consult Note            Date:6/5/2024        Patient Name:MINH Valdes     YOB: 1960     Age:63 y.o.    Consult to Gastroenterology  Consult performed by: Grinnell, Melissa R, PA-C  Consult ordered by: Valencia Ramos DO  Reason for consult: BRBPR after colonoscopy 5/31          Chief Complaint   Rectal bleeding, dizziness    History Obtained From   patient, electronic medical record    History of Present Illness   Mr. Valdes is a 60-year-old male with PMH significant for HTN, HPL, GERD, fatty liver, chronic pain, peripheral neuropathy, arthritis, emphysema, cerebral aneurysm s/p clip. Surgical hx is pertinent for cardiac stents (on Xarelto and Aspirin). He denies abdominal surgical history.    He was admitted to hospitalist service 6/4/2024 for complaints of red rectal bleeding following colonoscopy 5/31 by Dr. Curry notable for 15 mm transverse colon polyp, 25 mm sigmoid colon polyp s/p clip, as well as mild sigmoid diverticulosis and internal hemorrhoids.  Pathology showed tubular adenoma polyps.  Recommended recall was 1 year.  Hgb on presentation was 5.6 down from 12.6 on 5/22/2024. He has received 2 U pRBC overnight. Repeat Hgb this morning 6.8 with an additional 1 U pRBC pending transfusion.  GI consulted for evaluation.    Patient reports acute onset large-volume rectal bleeding on Sunday which she described as maroon-colored stool.  Last episode of rectal bleeding was Monday evening.  Since then has had no further bowel function or hematochezia.  Denies abdominal pain, nausea, vomiting.  He presented for dizziness and shortness of breath.    Past Medical History     Past Medical History:   Diagnosis Date    Arthritis     Avascular necrosis of bones of both hips (HCC)     Avascular necrosis of humeral head (HCC)     Chronic foot pain     Chronic left hip pain     Chronic pain     Emphysema     Fatty infiltration of liver     GERD (gastroesophageal reflux disease)     H/O supraventricular  Tenderness: There is no abdominal tenderness. There is no guarding or rebound.      Hernia: No hernia is present.      Comments: Protuberant abdomen   Skin:     General: Skin is warm and dry.      Coloration: Skin is not jaundiced.   Neurological:      General: No focal deficit present.      Mental Status: He is alert and oriented to person, place, and time.   Psychiatric:         Mood and Affect: Mood normal.         Behavior: Behavior normal.         Thought Content: Thought content normal.         Judgment: Judgment normal.         Labs    CBC:  Recent Labs     06/04/24  1728 06/05/24  0608   WBC 10.3  --    RBC 1.75*  --    HGB 5.6* 6.8*   HCT 17.0* 21.2*   MCV 97.1  --    RDW 15.1*  --      --      CHEMISTRIES:  Recent Labs     06/04/24  1728 06/05/24  0608    139   K 4.3 4.1    111*   CO2 20* 23   BUN 24* 18   CREATININE 1.80* 1.42*   GLUCOSE 131* 86     PT/INR:  Recent Labs     06/04/24  1755   PROTIME 20.2*   INR 1.7     APTT:No results for input(s): \"APTT\" in the last 72 hours.  LIVER PROFILE:  Recent Labs     06/04/24  1728   AST 15   ALT 6*   BILITOT <0.2*   ALKPHOS 70       Imaging/Diagnostics   No results found.    Assessment      Hospital Problems             Last Modified POA    * (Principal) Lower GI bleed 6/4/2024 Yes    Stage 3b chronic kidney disease (HCC) 6/4/2024 Yes    Essential hypertension 6/4/2024 Yes    Mixed hyperlipidemia 6/4/2024 Yes    Peripheral neuropathy 6/4/2024 Yes    Tobacco use 6/4/2024 Yes    Tachycardia induced cardiomyopathy (HCC) 6/4/2024 Yes    Paroxysmal atrial fibrillation (HCC) 6/4/2024 Yes       Plan   1.  ABLA: Hgb on presentation was 5.6 down from 12.6 on 5/22/2024. He has received 2 U pRBC overnight. Repeat Hgb this morning 6.8 with an additional 1 U pRBC pending transfusion. Iron studies and infusion per primary team. Continue to monitor and transfuse PRN.     2. Hematochezia: Colonoscopy 5/31 by Dr. Curry notable for 15 mm transverse colon polyp,

## 2024-06-05 NOTE — CONSENT
Informed Consent for Blood Component Transfusion Note    I have discussed with the patient the rationale for blood component transfusion; its benefits in treating or preventing fatigue, organ damage, or death; and its risk which includes mild transfusion reactions, rare risk of blood borne infection, or more serious but rare reactions. I have discussed the alternatives to transfusion, including the risk and consequences of not receiving transfusion. The patient had an opportunity to ask questions and had agreed to proceed with transfusion of blood components.    Electronically signed by FAITH ADAMS DO on 6/4/24 at 8:16 PM EDT

## 2024-06-05 NOTE — ACP (ADVANCE CARE PLANNING)
[facility] Hospitalist Service  At the heart of better care     Advance Care Planning   Admit Date:  2024  7:51 PM   Name:  MINH Valdes   Age:  63 y.o.  Sex:  male  :  1960   MRN:  [id number]   Room:  [facility]    MINH Valdes has capacity to make his own decisions:   Yes    Patient / surrogate decision-maker directed code status:  Full Code      Patient or surrogate consented to discussion of the current conditions, workup, management plans, prognosis, and the risk for further deterioration.  Time spent: 17 minutes in direct discussion.      Signed:  FAITH ADAMS DO

## 2024-06-06 VITALS
TEMPERATURE: 98 F | HEART RATE: 61 BPM | RESPIRATION RATE: 16 BRPM | SYSTOLIC BLOOD PRESSURE: 158 MMHG | WEIGHT: 222 LBS | DIASTOLIC BLOOD PRESSURE: 72 MMHG | OXYGEN SATURATION: 100 % | BODY MASS INDEX: 28.49 KG/M2 | HEIGHT: 74 IN

## 2024-06-06 LAB
ABO + RH BLD: NORMAL
ANION GAP SERPL CALC-SCNC: 8 MMOL/L (ref 9–18)
BLD PROD TYP BPU: NORMAL
BLOOD BANK BLOOD PRODUCT EXPIRATION DATE: NORMAL
BLOOD BANK DISPENSE STATUS: NORMAL
BLOOD BANK ISBT PRODUCT BLOOD TYPE: 5100
BLOOD BANK PRODUCT CODE: NORMAL
BLOOD BANK UNIT TYPE AND RH: NORMAL
BLOOD GROUP ANTIBODIES SERPL: NORMAL
BPU ID: NORMAL
BUN SERPL-MCNC: 12 MG/DL (ref 8–23)
CALCIUM SERPL-MCNC: 8.5 MG/DL (ref 8.8–10.2)
CHLORIDE SERPL-SCNC: 109 MMOL/L (ref 98–107)
CO2 SERPL-SCNC: 23 MMOL/L (ref 20–28)
CREAT SERPL-MCNC: 1.45 MG/DL (ref 0.8–1.3)
CROSSMATCH RESULT: NORMAL
ERYTHROCYTE [DISTWIDTH] IN BLOOD BY AUTOMATED COUNT: 15.9 % (ref 11.9–14.6)
GLUCOSE SERPL-MCNC: 97 MG/DL (ref 70–99)
HCT VFR BLD AUTO: 28.6 % (ref 41.1–50.3)
HGB BLD-MCNC: 9.3 G/DL (ref 13.6–17.2)
MAGNESIUM SERPL-MCNC: 1.7 MG/DL (ref 1.8–2.4)
MCH RBC QN AUTO: 30.2 PG (ref 26.1–32.9)
MCHC RBC AUTO-ENTMCNC: 32.5 G/DL (ref 31.4–35)
MCV RBC AUTO: 92.9 FL (ref 82–102)
NRBC # BLD: 0.02 K/UL (ref 0–0.2)
PLATELET # BLD AUTO: 191 K/UL (ref 150–450)
PMV BLD AUTO: 10.3 FL (ref 9.4–12.3)
POTASSIUM SERPL-SCNC: 4.5 MMOL/L (ref 3.5–5.1)
RBC # BLD AUTO: 3.08 M/UL (ref 4.23–5.6)
SODIUM SERPL-SCNC: 140 MMOL/L (ref 136–145)
SPECIMEN EXP DATE BLD: NORMAL
UNIT DIVISION: 0
UNIT ISSUE DATE/TIME: NORMAL
WBC # BLD AUTO: 6.8 K/UL (ref 4.3–11.1)

## 2024-06-06 PROCEDURE — 80048 BASIC METABOLIC PNL TOTAL CA: CPT

## 2024-06-06 PROCEDURE — 6360000002 HC RX W HCPCS: Performed by: INTERNAL MEDICINE

## 2024-06-06 PROCEDURE — 6370000000 HC RX 637 (ALT 250 FOR IP): Performed by: FAMILY MEDICINE

## 2024-06-06 PROCEDURE — 85027 COMPLETE CBC AUTOMATED: CPT

## 2024-06-06 PROCEDURE — 6370000000 HC RX 637 (ALT 250 FOR IP): Performed by: INTERNAL MEDICINE

## 2024-06-06 PROCEDURE — 36415 COLL VENOUS BLD VENIPUNCTURE: CPT

## 2024-06-06 PROCEDURE — 2580000003 HC RX 258: Performed by: FAMILY MEDICINE

## 2024-06-06 PROCEDURE — 83735 ASSAY OF MAGNESIUM: CPT

## 2024-06-06 RX ORDER — VALSARTAN 80 MG/1
80 TABLET ORAL ONCE
Status: COMPLETED | OUTPATIENT
Start: 2024-06-06 | End: 2024-06-06

## 2024-06-06 RX ORDER — VALSARTAN 160 MG/1
160 TABLET ORAL DAILY
Qty: 30 TABLET | Refills: 2 | Status: SHIPPED | OUTPATIENT
Start: 2024-06-07 | End: 2024-06-12 | Stop reason: SDUPTHER

## 2024-06-06 RX ORDER — FERROUS SULFATE 325(65) MG
325 TABLET ORAL
Qty: 30 TABLET | Refills: 0 | Status: SHIPPED | OUTPATIENT
Start: 2024-06-06 | End: 2024-07-06

## 2024-06-06 RX ORDER — VALSARTAN 80 MG/1
160 TABLET ORAL DAILY
Status: DISCONTINUED | OUTPATIENT
Start: 2024-06-07 | End: 2024-06-06 | Stop reason: HOSPADM

## 2024-06-06 RX ADMIN — HYDRALAZINE HYDROCHLORIDE 20 MG: 20 INJECTION INTRAMUSCULAR; INTRAVENOUS at 04:09

## 2024-06-06 RX ADMIN — AMIODARONE HYDROCHLORIDE 200 MG: 200 TABLET ORAL at 08:10

## 2024-06-06 RX ADMIN — FLUTICASONE PROPIONATE 2 SPRAY: 50 SPRAY, METERED NASAL at 08:11

## 2024-06-06 RX ADMIN — CETIRIZINE HYDROCHLORIDE 10 MG: 10 TABLET, FILM COATED ORAL at 08:10

## 2024-06-06 RX ADMIN — ATORVASTATIN CALCIUM 20 MG: 10 TABLET, FILM COATED ORAL at 08:10

## 2024-06-06 RX ADMIN — VALSARTAN 80 MG: 80 TABLET ORAL at 09:16

## 2024-06-06 RX ADMIN — PANTOPRAZOLE SODIUM 40 MG: 40 TABLET, DELAYED RELEASE ORAL at 05:03

## 2024-06-06 RX ADMIN — MONTELUKAST 10 MG: 10 TABLET, FILM COATED ORAL at 08:10

## 2024-06-06 RX ADMIN — SODIUM CHLORIDE, PRESERVATIVE FREE 10 ML: 5 INJECTION INTRAVENOUS at 08:11

## 2024-06-06 RX ADMIN — VALSARTAN 80 MG: 80 TABLET ORAL at 08:10

## 2024-06-06 RX ADMIN — HYDRALAZINE HYDROCHLORIDE 20 MG: 20 INJECTION INTRAMUSCULAR; INTRAVENOUS at 08:11

## 2024-06-06 ASSESSMENT — PAIN SCALES - GENERAL: PAINLEVEL_OUTOF10: 0

## 2024-06-06 NOTE — DISCHARGE SUMMARY
Hospitalist Discharge Summary   Admit Date:  2024  7:51 PM   DC Note date: 2024  Name:  MINH Valdes   Age:  63 y.o.  Sex:  male  :  1960   MRN:  [id number]   Room:  [facility]  PCP:  Josefa Preston APRN - CNP    Presenting Complaint: No chief complaint on file.     Initial Admission Diagnosis: Lower GI bleed [K92.2]     Problem List for this Hospitalization (present on admission):    Principal Problem:    ABLA (acute blood loss anemia)  Active Problems:    Stage 3b chronic kidney disease (HCC)    Essential hypertension    Mixed hyperlipidemia    Peripheral neuropathy    Tobacco use    Tachycardia induced cardiomyopathy (HCC)    Paroxysmal atrial fibrillation (HCC)    Lower GI bleed    Hematochezia    Colonoscopy causing post-procedural bleeding  Resolved Problems:    * No resolved hospital problems. *      Hospital Course:  MINH Valdes is a 63 y.o. male Hx of p a fib on Xarelto, CKD 3, HTN, HLD, HFrEF EF40%, who presented to the ED for cc persistent bright red bleeding per rectum since . Had a colonoscopy on May 31st in which some polyps were removed. Hx of p a fib on Xarelto. Also on ASA. Hg found to be 5.6 from baseline 12. Hg was 12.6 on 24     Admitted for post polypectomy bleeding caused by xarelto.  Xarelto and ASA held.  Given 3u RBC.   Normal brown stool yesterday.  Suspect just had excessive bleeding from procedure.  Ok to resume xarelto in 2 days    Bradycardic into 40s early in hospitalization.  Better now but still low 60s.  Doesn't seem to be symptomatic.  Ok to continue amiodarone but hold toprol for now.  Increase valsartan as BP uncontrolled last 24 hours on home dose.      Ferritin lower than in past.  iron for one month to bolster recovery.    Follow up PCP    Disposition: Home  Diet: ADULT DIET; Regular  Code Status: Full Code    Follow Ups:  Follow-up Information       Follow up With Specialties Details Why Contact Info    Josefa Preston APRN - CNP Nurse Practitioner  and affect.      Signed:  Bayron Hollis MD    Part of this note may have been written by using a voice dictation software.  The note has been proof read but may still contain some grammatical/other typographical errors.

## 2024-06-07 ENCOUNTER — CARE COORDINATION (OUTPATIENT)
Dept: CARE COORDINATION | Facility: CLINIC | Age: 64
End: 2024-06-07

## 2024-06-07 NOTE — CARE COORDINATION
Care Transitions Outreach Attempt    Call within 2 business days of discharge: Yes   Attempted to reach patient for transitions of care follow up. Unable to reach patient.    Patient: MINH Valdes Patient : 1960 MRN: [id number]    Last Discharge Facility       Date Complaint Diagnosis Description Type Department Provider    24   Admission (Discharged) [id number] Bayron Hollis MD    24 Dizziness; Rectal Bleeding Lower GI bleed ... ED (TRANSFER) Emeka Doty MD              Was this an external facility discharge? No Discharge Facility Name: [facility]    Noted following upcoming appointments from discharge chart review:   [facility] follow up appointment(s):   Future Appointments   Date Time Provider Department Center   2024  4:00 PM Josefa Preston APRN - CNP [facility] AMB   2024  9:30 AM Gilberto Cleaning MD [facility] AM[facility]   2024  9:20 AM Diego Araujo DO [facility] AMB   10/30/2024  2:45 PM Carmen Morrow MD [facility] AMB   2024  9:40 AM Josefa Preston APRN - CNP [facility] AMB     Non-BS  follow up appointment(s): none noted

## 2024-06-10 ENCOUNTER — CARE COORDINATION (OUTPATIENT)
Dept: CARE COORDINATION | Facility: CLINIC | Age: 64
End: 2024-06-10

## 2024-06-10 NOTE — CARE COORDINATION
Attempted to reach patient for transitions of care follow up. Unable to reach patient.    Outreach Attempts:   HIPAA compliant voicemail left for spouse/partner .     Patient: MINH Valdes    Patient : 1960   MRN: [id number]    Reason for Admission: Lower GI bleed  Discharge Date: 24  RURS: Readmission Risk Score: 12.3    Last Discharge Facility       Date Complaint Diagnosis Description Type Department Provider    24   Admission (Discharged) Bayron Pillai MD    24 Dizziness; Rectal Bleeding Lower GI bleed ... ED (TRANSFER) Emeka Doty MD            Was this an external facility discharge? No    Follow Up Appointment:     Future Appointments         Provider Specialty Dept Phone    2024 4:00 PM Josefa Preston, G[facility] - [facility] [phone]    2024 9:30 AM Gilberto Cleaning MD Cardiology [phone]    2024 9:20 AM Diego Araujo DO Family Medicine [phone]    10/30/2024 2:45 PM Carmen Morrow MD Cardiology [phone]    2024 9:40 AM Josefa Preston APRN - [facility] [phone]            Plan for follow-up call in 2-5 days     Olivia Kaye RN

## 2024-06-12 ENCOUNTER — OFFICE VISIT (OUTPATIENT)
Dept: FAMILY MEDICINE CLINIC | Facility: CLINIC | Age: 64
End: 2024-06-12

## 2024-06-12 VITALS
DIASTOLIC BLOOD PRESSURE: 80 MMHG | RESPIRATION RATE: 16 BRPM | SYSTOLIC BLOOD PRESSURE: 140 MMHG | OXYGEN SATURATION: 98 % | WEIGHT: 223 LBS | HEART RATE: 82 BPM | HEIGHT: 74 IN | TEMPERATURE: 98 F | BODY MASS INDEX: 28.62 KG/M2

## 2024-06-12 DIAGNOSIS — K92.2 GASTROINTESTINAL HEMORRHAGE, UNSPECIFIED GASTROINTESTINAL HEMORRHAGE TYPE: ICD-10-CM

## 2024-06-12 DIAGNOSIS — Z09 HOSPITAL DISCHARGE FOLLOW-UP: ICD-10-CM

## 2024-06-12 DIAGNOSIS — I10 ESSENTIAL HYPERTENSION: Primary | ICD-10-CM

## 2024-06-12 DIAGNOSIS — J30.1 ALLERGIC RHINITIS DUE TO POLLEN, UNSPECIFIED SEASONALITY: ICD-10-CM

## 2024-06-12 LAB
ALBUMIN SERPL-MCNC: 3.2 G/DL (ref 3.2–4.6)
ALBUMIN/GLOB SERPL: 0.9 (ref 1–1.9)
ALP SERPL-CCNC: 78 U/L (ref 40–129)
ALT SERPL-CCNC: 8 U/L (ref 12–65)
ANION GAP SERPL CALC-SCNC: 8 MMOL/L (ref 9–18)
AST SERPL-CCNC: 23 U/L (ref 15–37)
BASOPHILS # BLD: 0.1 K/UL (ref 0–0.2)
BASOPHILS NFR BLD: 1 % (ref 0–2)
BILIRUB SERPL-MCNC: 0.2 MG/DL (ref 0–1.2)
BUN SERPL-MCNC: 14 MG/DL (ref 8–23)
CALCIUM SERPL-MCNC: 8.7 MG/DL (ref 8.8–10.2)
CHLORIDE SERPL-SCNC: 107 MMOL/L (ref 98–107)
CO2 SERPL-SCNC: 25 MMOL/L (ref 20–28)
CREAT SERPL-MCNC: 1.57 MG/DL (ref 0.8–1.3)
DIFFERENTIAL METHOD BLD: ABNORMAL
EOSINOPHIL # BLD: 0.2 K/UL (ref 0–0.8)
EOSINOPHIL NFR BLD: 4 % (ref 0.5–7.8)
ERYTHROCYTE [DISTWIDTH] IN BLOOD BY AUTOMATED COUNT: 15.2 % (ref 11.9–14.6)
GLOBULIN SER CALC-MCNC: 3.6 G/DL (ref 2.3–3.5)
GLUCOSE SERPL-MCNC: 102 MG/DL (ref 70–99)
HCT VFR BLD AUTO: 28.8 % (ref 41.1–50.3)
HGB BLD-MCNC: 9.1 G/DL (ref 13.6–17.2)
IMM GRANULOCYTES # BLD AUTO: 0 K/UL (ref 0–0.5)
IMM GRANULOCYTES NFR BLD AUTO: 1 % (ref 0–5)
LYMPHOCYTES # BLD: 1.5 K/UL (ref 0.5–4.6)
LYMPHOCYTES NFR BLD: 23 % (ref 13–44)
MAGNESIUM SERPL-MCNC: 1.7 MG/DL (ref 1.8–2.4)
MCH RBC QN AUTO: 31 PG (ref 26.1–32.9)
MCHC RBC AUTO-ENTMCNC: 31.6 G/DL (ref 31.4–35)
MCV RBC AUTO: 98 FL (ref 82–102)
MONOCYTES # BLD: 0.7 K/UL (ref 0.1–1.3)
MONOCYTES NFR BLD: 10 % (ref 4–12)
NEUTS SEG # BLD: 3.9 K/UL (ref 1.7–8.2)
NEUTS SEG NFR BLD: 61 % (ref 43–78)
NRBC # BLD: 0 K/UL (ref 0–0.2)
PLATELET # BLD AUTO: 374 K/UL (ref 150–450)
PMV BLD AUTO: 9.7 FL (ref 9.4–12.3)
POTASSIUM SERPL-SCNC: 4.4 MMOL/L (ref 3.5–5.1)
PROT SERPL-MCNC: 6.8 G/DL (ref 6.3–8.2)
RBC # BLD AUTO: 2.94 M/UL (ref 4.23–5.6)
SODIUM SERPL-SCNC: 139 MMOL/L (ref 136–145)
WBC # BLD AUTO: 6.3 K/UL (ref 4.3–11.1)

## 2024-06-12 RX ORDER — VALSARTAN 160 MG/1
160 TABLET ORAL DAILY
Qty: 30 TABLET | Refills: 2 | Status: SHIPPED | OUTPATIENT
Start: 2024-06-12

## 2024-06-12 RX ORDER — LORATADINE 10 MG/1
10 TABLET ORAL DAILY
Qty: 90 TABLET | Refills: 1 | Status: SHIPPED | OUTPATIENT
Start: 2024-06-12 | End: 2024-12-09

## 2024-06-12 SDOH — ECONOMIC STABILITY: INCOME INSECURITY: HOW HARD IS IT FOR YOU TO PAY FOR THE VERY BASICS LIKE FOOD, HOUSING, MEDICAL CARE, AND HEATING?: NOT HARD AT ALL

## 2024-06-12 SDOH — ECONOMIC STABILITY: FOOD INSECURITY: WITHIN THE PAST 12 MONTHS, THE FOOD YOU BOUGHT JUST DIDN'T LAST AND YOU DIDN'T HAVE MONEY TO GET MORE.: NEVER TRUE

## 2024-06-12 SDOH — ECONOMIC STABILITY: FOOD INSECURITY: WITHIN THE PAST 12 MONTHS, YOU WORRIED THAT YOUR FOOD WOULD RUN OUT BEFORE YOU GOT MONEY TO BUY MORE.: NEVER TRUE

## 2024-06-12 ASSESSMENT — PATIENT HEALTH QUESTIONNAIRE - PHQ9
SUM OF ALL RESPONSES TO PHQ9 QUESTIONS 1 & 2: 0
SUM OF ALL RESPONSES TO PHQ QUESTIONS 1-9: 0
1. LITTLE INTEREST OR PLEASURE IN DOING THINGS: NOT AT ALL
SUM OF ALL RESPONSES TO PHQ QUESTIONS 1-9: 0
2. FEELING DOWN, DEPRESSED OR HOPELESS: NOT AT ALL

## 2024-06-12 NOTE — PROGRESS NOTES
MINH Valdes (: 1960) presents today for follow up from hospital admission. Was admitted -2024.   From chart review can see:      \"Hospital Course:  MINH Valdes is a 63 y.o. male Hx of p a fib on Xarelto, CKD 3, HTN, HLD, HFrEF EF40%, who presented to the ED for cc persistent bright red bleeding per rectum since . Had a colonoscopy on May 31st in which some polyps were removed. Hx of p a fib on Xarelto. Also on ASA. Hg found to be 5.6 from baseline 12. Hg was 12.6 on 24     Admitted for post polypectomy bleeding caused by xarelto.  Xarelto and ASA held.  Given 3u RBC.   Normal brown stool yesterday.  Suspect just had excessive bleeding from procedure.  Ok to resume xarelto in 2 days     Bradycardic into 40s early in hospitalization.  Better now but still low 60s.  Doesn't seem to be symptomatic.  Ok to continue amiodarone but hold toprol for now.  Increase valsartan as BP uncontrolled last 24 hours on home dose.       Ferritin lower than in past.  iron for one month to bolster recovery.\"    States he had his colonoscopy and 2 days later they think a clip came loose and then he started bleeding. Has started back on his xarelto and has had no more signs of bleeding. Has not been feeling fatigue. Has been having some dizziness but thinks this is from congestion in his sinuses. Does not think his allergy medicine is not working anymore. No chest pain or SOB. Was on zyrtec and does not think is working anymore. Ever since being home heart rate has been normal- has not had any more bradycardia. On valsartan 160 mg, amiodarone 200 mg. Has been off of the toprol and has not had low heart rate. He brought his bp cuff- his cuff showing 200 systolic- our manual bp reading 140/80.    Has not been taking iron because it has not been in stock.     Chief Complaint   Patient presents with    Follow-up     Patient Active Problem List

## 2024-06-16 DIAGNOSIS — D50.0 IRON DEFICIENCY ANEMIA DUE TO CHRONIC BLOOD LOSS: Primary | ICD-10-CM

## 2024-06-27 PROBLEM — Z12.11 ENCOUNTER FOR SCREENING COLONOSCOPY: Status: RESOLVED | Noted: 2024-05-28 | Resolved: 2024-06-27

## 2024-07-01 ENCOUNTER — INITIAL CONSULT (OUTPATIENT)
Age: 64
End: 2024-07-01
Payer: MEDICARE

## 2024-07-01 VITALS
SYSTOLIC BLOOD PRESSURE: 142 MMHG | DIASTOLIC BLOOD PRESSURE: 84 MMHG | HEIGHT: 74 IN | BODY MASS INDEX: 28.75 KG/M2 | HEART RATE: 62 BPM | WEIGHT: 224 LBS

## 2024-07-01 DIAGNOSIS — I10 ESSENTIAL HYPERTENSION: Primary | ICD-10-CM

## 2024-07-01 DIAGNOSIS — I50.21 ACUTE HFREF (HEART FAILURE WITH REDUCED EJECTION FRACTION) (HCC): ICD-10-CM

## 2024-07-01 PROCEDURE — 1123F ACP DISCUSS/DSCN MKR DOCD: CPT | Performed by: INTERNAL MEDICINE

## 2024-07-01 PROCEDURE — 3077F SYST BP >= 140 MM HG: CPT | Performed by: INTERNAL MEDICINE

## 2024-07-01 PROCEDURE — 3079F DIAST BP 80-89 MM HG: CPT | Performed by: INTERNAL MEDICINE

## 2024-07-01 PROCEDURE — 99214 OFFICE O/P EST MOD 30 MIN: CPT | Performed by: INTERNAL MEDICINE

## 2024-07-01 PROCEDURE — 93000 ELECTROCARDIOGRAM COMPLETE: CPT | Performed by: INTERNAL MEDICINE

## 2024-07-01 ASSESSMENT — ENCOUNTER SYMPTOMS
EYES NEGATIVE: 1
ALLERGIC/IMMUNOLOGIC NEGATIVE: 1
GASTROINTESTINAL NEGATIVE: 1
SHORTNESS OF BREATH: 0

## 2024-07-01 NOTE — PROGRESS NOTES
[facility] CARDIOLOGY  [address] [place] [address], [address]  [place] [address]  PHONE: [phone]        24    NAME:  MINH Valdes  : 1960  MRN: [id number]     Referring Cardiologist: Jasvir Johnson DO and Camren Morrow MD    Reason for Consultation: Atrial fibrillation    ASSESSMENT and PLAN:  Persistent atrial fibrillation  NICM, thought 2/2 TICM, EF 50-55% 2023  CAD  CKD    64 year old male with a history of persistent AF and NICM, mild CKD. Admitted in 2023 with AF with RVR.     -AF - discussed options, will continue metoprolol (high dose) and Xarelto. S/p EMILY/DCCV in 2023. Remains in NSR. Has been in rhythm for several months now. Will reduce amiodarone to 100 mg po daily, goal is to eventually stop given long term risk for side effects. Favor ablation when/if AF burden increases. Discussed with pt today. Will hold off ablation for now given recent admission for severe anemia.   -Biannual TFTs, LFTs, yearly PFTs and eye exams while on amiodarone.     -Stroke ppx - continue Xarelto for now, consider transtion to Eliquis vs Watchman if recurrent bleeding.     -CAD - Stable, continue OMT.    -ABLA - likely from bleeding polyp s/p snare. Per GI, H/H improving. May need to consider Watchman in future.     -CKD - Stable. Careful assessment of renal function.     -NICM - EF low normal range earlier this year, but severely depressed previously, likely TICM.    -Routine cardiac care per Dr. Morrow    -EP Follow up in 6 months or PRN.    AF ABLATION EDUCATION (done today):  I discussed with the patient the pathophysiology of atrial fibrillation, including details about potential triggers from the pulmonary veins (PVs), as well as non-PV sites.  We also discussed that in certain patients (especially those with more persistent AF) there is often atrial substrate (i.e. fibrosis, dilatation, etc.) that promotes more sustained AF. We also discussed the therapeutic options for treatment of atrial

## 2024-07-08 ENCOUNTER — TELEPHONE (OUTPATIENT)
Age: 64
End: 2024-07-08

## 2024-07-08 NOTE — TELEPHONE ENCOUNTER
Spoke with pt he wanted to verify medication change done at his last appt on 7/1/2024 made him aware per Dr Cleaning Will reduce amiodarone to 100 mg po daily, goal is to eventually stop given long term risk for side effects. Favor ablation when/if AF burden increases. Discussed with pt today. Will hold off ablation for now given recent admission for severe anemia.     Pt verbalized understanding will follow advising

## 2024-07-15 ENCOUNTER — OFFICE VISIT (OUTPATIENT)
Dept: FAMILY MEDICINE CLINIC | Facility: CLINIC | Age: 64
End: 2024-07-15
Payer: MEDICARE

## 2024-07-15 VITALS
BODY MASS INDEX: 29.2 KG/M2 | RESPIRATION RATE: 16 BRPM | SYSTOLIC BLOOD PRESSURE: 140 MMHG | TEMPERATURE: 98.9 F | DIASTOLIC BLOOD PRESSURE: 80 MMHG | HEIGHT: 74 IN | HEART RATE: 70 BPM | OXYGEN SATURATION: 98 % | WEIGHT: 227.5 LBS

## 2024-07-15 DIAGNOSIS — L03.114 CELLULITIS OF LEFT HAND: Primary | ICD-10-CM

## 2024-07-15 DIAGNOSIS — I48.0 PAF (PAROXYSMAL ATRIAL FIBRILLATION) (HCC): ICD-10-CM

## 2024-07-15 DIAGNOSIS — Z87.19 HISTORY OF GI BLEED: ICD-10-CM

## 2024-07-15 DIAGNOSIS — M25.561 RIGHT KNEE PAIN, UNSPECIFIED CHRONICITY: ICD-10-CM

## 2024-07-15 PROCEDURE — 3079F DIAST BP 80-89 MM HG: CPT | Performed by: NURSE PRACTITIONER

## 2024-07-15 PROCEDURE — 3077F SYST BP >= 140 MM HG: CPT | Performed by: NURSE PRACTITIONER

## 2024-07-15 PROCEDURE — 99214 OFFICE O/P EST MOD 30 MIN: CPT | Performed by: NURSE PRACTITIONER

## 2024-07-15 PROCEDURE — 1123F ACP DISCUSS/DSCN MKR DOCD: CPT | Performed by: NURSE PRACTITIONER

## 2024-07-15 RX ORDER — DOXYCYCLINE HYCLATE 100 MG
100 TABLET ORAL 2 TIMES DAILY
Qty: 20 TABLET | Refills: 0 | Status: SHIPPED | OUTPATIENT
Start: 2024-07-15 | End: 2024-07-25

## 2024-07-15 ASSESSMENT — ENCOUNTER SYMPTOMS
EYE REDNESS: 0
EYE PAIN: 0
BACK PAIN: 0
SORE THROAT: 0
SINUS PAIN: 0
NAUSEA: 0
BLOOD IN STOOL: 0
VOMITING: 0
COLOR CHANGE: 1
CONSTIPATION: 0
DIARRHEA: 0
SHORTNESS OF BREATH: 0
COUGH: 0

## 2024-07-15 ASSESSMENT — PATIENT HEALTH QUESTIONNAIRE - PHQ9
SUM OF ALL RESPONSES TO PHQ9 QUESTIONS 1 & 2: 0
SUM OF ALL RESPONSES TO PHQ QUESTIONS 1-9: 0
2. FEELING DOWN, DEPRESSED OR HOPELESS: NOT AT ALL
SUM OF ALL RESPONSES TO PHQ QUESTIONS 1-9: 0
1. LITTLE INTEREST OR PLEASURE IN DOING THINGS: NOT AT ALL

## 2024-07-15 NOTE — PROGRESS NOTES
Negative for polydipsia.   Genitourinary:  Negative for dysuria, frequency and urgency.   Musculoskeletal:  Positive for arthralgias. Negative for back pain and myalgias.   Skin:  Positive for color change. Negative for rash.   Allergic/Immunologic: Negative for environmental allergies.   Neurological:  Negative for dizziness and headaches.   Psychiatric/Behavioral:  Negative for hallucinations and suicidal ideas.           Objective   Physical Exam  Vitals and nursing note reviewed.   Constitutional:       General: He is not in acute distress.     Appearance: Normal appearance.   HENT:      Head: Normocephalic.      Right Ear: External ear normal.      Left Ear: External ear normal.      Nose: Nose normal.      Mouth/Throat:      Lips: Pink.      Mouth: Mucous membranes are moist.   Eyes:      Conjunctiva/sclera: Conjunctivae normal.      Pupils: Pupils are equal, round, and reactive to light.   Cardiovascular:      Rate and Rhythm: Normal rate and regular rhythm.   Pulmonary:      Effort: Pulmonary effort is normal.      Breath sounds: Normal breath sounds. No wheezing, rhonchi or rales.   Musculoskeletal:         General: Normal range of motion.      Cervical back: Normal range of motion.      Right knee: No swelling, deformity, effusion, erythema, ecchymosis or lacerations. Normal range of motion. Tenderness present over the medial joint line.      Right lower leg: No edema.      Left lower leg: No edema.   Lymphadenopathy:      Cervical: No cervical adenopathy.   Skin:     General: Skin is warm and dry.      Capillary Refill: Capillary refill takes less than 2 seconds.      Findings: Erythema and lesion present. No rash.      Comments: L dorsal hand - nickel size induration with mild surrounded swelling.  No streaking, drainage or fluctuance noted.  Pulses/perfusion intact.     Neurological:      General: No focal deficit present.      Mental Status: He is alert and oriented to person, place, and time. Mental

## 2024-07-25 ENCOUNTER — TELEPHONE (OUTPATIENT)
Dept: FAMILY MEDICINE CLINIC | Facility: CLINIC | Age: 64
End: 2024-07-25

## 2024-08-07 DIAGNOSIS — E78.2 MIXED HYPERLIPIDEMIA: ICD-10-CM

## 2024-08-07 RX ORDER — ATORVASTATIN CALCIUM 20 MG/1
20 TABLET, FILM COATED ORAL DAILY
Qty: 90 TABLET | Refills: 1 | OUTPATIENT
Start: 2024-08-07

## 2024-08-16 NOTE — TELEPHONE ENCOUNTER
Brenda with [facility] called stating she has the following issue in regard to the patients prescription :    Questions on Rx for Xarelto  Mentioned a fax for same    Please call and advise.

## 2024-08-16 NOTE — TELEPHONE ENCOUNTER
Requested Prescriptions     Pending Prescriptions Disp Refills    rivaroxaban (XARELTO) 20 MG TABS tablet 90 tablet 3     Sig: Take 1 tablet by mouth Daily with supper Resume taking on 6-8-24     Rx verified last ov 7/1/24 with Dr. Cleaning

## 2024-08-21 ENCOUNTER — OFFICE VISIT (OUTPATIENT)
Dept: FAMILY MEDICINE CLINIC | Facility: CLINIC | Age: 64
End: 2024-08-21

## 2024-08-21 VITALS
RESPIRATION RATE: 16 BRPM | HEIGHT: 74 IN | WEIGHT: 226.5 LBS | BODY MASS INDEX: 29.07 KG/M2 | OXYGEN SATURATION: 98 % | TEMPERATURE: 97.9 F | DIASTOLIC BLOOD PRESSURE: 100 MMHG | SYSTOLIC BLOOD PRESSURE: 150 MMHG | HEART RATE: 76 BPM

## 2024-08-21 DIAGNOSIS — M25.561 CHRONIC PAIN OF RIGHT KNEE: Primary | ICD-10-CM

## 2024-08-21 DIAGNOSIS — G89.29 CHRONIC PAIN OF RIGHT KNEE: Primary | ICD-10-CM

## 2024-08-21 RX ORDER — TRIAMCINOLONE ACETONIDE 40 MG/ML
40 INJECTION, SUSPENSION INTRA-ARTICULAR; INTRAMUSCULAR ONCE
Status: COMPLETED | OUTPATIENT
Start: 2024-08-21 | End: 2024-08-21

## 2024-08-21 RX ORDER — BETAMETHASONE SODIUM PHOSPHATE AND BETAMETHASONE ACETATE 3; 3 MG/ML; MG/ML
6 INJECTION, SUSPENSION INTRA-ARTICULAR; INTRALESIONAL; INTRAMUSCULAR; SOFT TISSUE ONCE
Status: DISCONTINUED | OUTPATIENT
Start: 2024-08-21 | End: 2024-08-21

## 2024-08-21 RX ADMIN — TRIAMCINOLONE ACETONIDE 40 MG: 40 INJECTION, SUSPENSION INTRA-ARTICULAR; INTRAMUSCULAR at 10:00

## 2024-08-21 ASSESSMENT — PATIENT HEALTH QUESTIONNAIRE - PHQ9
SUM OF ALL RESPONSES TO PHQ QUESTIONS 1-9: 0
1. LITTLE INTEREST OR PLEASURE IN DOING THINGS: NOT AT ALL
SUM OF ALL RESPONSES TO PHQ QUESTIONS 1-9: 0
SUM OF ALL RESPONSES TO PHQ9 QUESTIONS 1 & 2: 0
2. FEELING DOWN, DEPRESSED OR HOPELESS: NOT AT ALL

## 2024-08-21 NOTE — PROGRESS NOTES
MINH Valdes (:  1960) is a 64 y.o. male,Established patient, here for evaluation of the following chief complaint(s):  Follow-up (3 Month follow up)     Assessment & Plan  Chronic pain of right knee     Reviewed Xrays showing Orders:    triamcinolone acetonide (KENALOG-40) injection 40 mg    DRAIN/INJECT LARGE JOINT/BURSA  Nonexpansile distal femoral central metadiaphyseal lesion with a sclerotic rim and a few internal ring and arcs which may present an enchondroma.Augmentation cement is possible. No evidence of  acute fracture or dislocation.   Verbal  informed consent was obtained including a discussion of risks vs benefits, alternatives and possible complications including but not limited to infection, pain, bleeding, steroid flare, fat necrosis, skin discoloration and injury to nerves, blood vessels or underlying structures.     PRE-OP DIAGNOSIS: Knee Pain (Right)  POST-OP DIAGNOSIS: Same   PROCEDURE: Knee Injection   Performing Physician: Diego Araujo D.O.  Medication: 3 ml of 1%Lidocaine + 1ml 40 mg/ml Kenalog    Procedure:     The area was then sterilized with alcohol. Pain ease spray was used for local anesthesia. Then under sterile conditions, a 1.5 inch 25 gauge needle with a mixture of 3 ml of 1% lidocaine with 1 mL of 40 mg of Kenalog was injected via a lateral approach.  There were no complications and patient tolerated the procedure well. NV intact before and after the procedure.    Followup:   Standard post-procedure care is explained and return  precautions are given.  (call the office or go to the ER immediately if there are any signs or symptoms of infection like warmth, swelling, redness, drainage or significant increase in pain over the injection site).    Subjective   HPI  MINH Valdes (:  1960) is a 64 y.o. male,Established patient, here for evaluation of the following chief complaint(s):  Knee Pain-right    Knee Pain   Recurrent right knee pain off and on for 6 months. No current

## 2024-08-23 ENCOUNTER — TELEPHONE (OUTPATIENT)
Dept: FAMILY MEDICINE CLINIC | Facility: CLINIC | Age: 64
End: 2024-08-23

## 2024-08-23 NOTE — TELEPHONE ENCOUNTER
Pt. Called in stating he went to Urgent Care today and is positive for Covid, they advised him to call pcp to see if his kidneys are good enough to take Paxlovid, if yes to please send him in a prescription .    Clear

## 2024-08-23 NOTE — TELEPHONE ENCOUNTER
Attempted to call the patient, was unavailable.  Reached his son and updated him that the kidney function looked good.  However, I did tell him that unless he had considerable risk factors that he did not necessarily need to take the Paxlovid as his symptoms should improve over the next 2 to 3-day

## 2024-08-24 DIAGNOSIS — I10 ESSENTIAL HYPERTENSION: ICD-10-CM

## 2024-08-29 ENCOUNTER — TELEPHONE (OUTPATIENT)
Age: 64
End: 2024-08-29

## 2024-08-29 DIAGNOSIS — J30.1 ALLERGIC RHINITIS DUE TO POLLEN, UNSPECIFIED SEASONALITY: ICD-10-CM

## 2024-08-29 DIAGNOSIS — E78.2 MIXED HYPERLIPIDEMIA: ICD-10-CM

## 2024-08-29 RX ORDER — MONTELUKAST SODIUM 10 MG/1
10 TABLET ORAL DAILY
Qty: 90 TABLET | Refills: 1 | OUTPATIENT
Start: 2024-08-29

## 2024-08-29 RX ORDER — ATORVASTATIN CALCIUM 20 MG/1
20 TABLET, FILM COATED ORAL DAILY
Qty: 90 TABLET | Refills: 1 | OUTPATIENT
Start: 2024-08-29

## 2024-08-29 NOTE — TELEPHONE ENCOUNTER
MEDICATION REFILL REQUEST      Name of Medication:  Xarelto  Dose:  20 mg  Frequency:  QD  Quantity:  90  Days' supply:  90 with 3 refills      Pharmacy Name/Location:  [facility]-[phone]

## 2024-08-29 NOTE — TELEPHONE ENCOUNTER
Patient just had this medication called in on 8/16. Spoke with patient, he said pharmacy informed him rx would be ready by 5 o'clock today.

## 2024-08-31 DIAGNOSIS — J30.1 ALLERGIC RHINITIS DUE TO POLLEN, UNSPECIFIED SEASONALITY: ICD-10-CM

## 2024-08-31 DIAGNOSIS — E78.2 MIXED HYPERLIPIDEMIA: ICD-10-CM

## 2024-09-03 RX ORDER — VALSARTAN 80 MG/1
80 TABLET ORAL DAILY
Qty: 90 TABLET | Refills: 3 | OUTPATIENT
Start: 2024-09-03

## 2024-09-03 NOTE — TELEPHONE ENCOUNTER
Called and spoke with patient he confirmed he is taking 160 mg once daily.     Requested Prescriptions     Pending Prescriptions Disp Refills    valsartan (DIOVAN) 160 MG tablet 90 tablet 3     Sig: Take 1 tablet by mouth daily     Refused Prescriptions Disp Refills    valsartan (DIOVAN) 80 MG tablet [Pharmacy Med Name: VALSARTAN 80 MG TABLET] 90 tablet 3     Sig: TAKE 1 TABLET BY MOUTH EVERY DAY     Refused By: PAO GILMAN     Reason for Refusal: Other

## 2024-09-04 RX ORDER — VALSARTAN 160 MG/1
160 TABLET ORAL DAILY
Qty: 90 TABLET | Refills: 3 | Status: SHIPPED | OUTPATIENT
Start: 2024-09-04

## 2024-09-04 RX ORDER — ATORVASTATIN CALCIUM 20 MG/1
20 TABLET, FILM COATED ORAL DAILY
Qty: 90 TABLET | Refills: 1 | OUTPATIENT
Start: 2024-09-04

## 2024-09-04 RX ORDER — MONTELUKAST SODIUM 10 MG/1
10 TABLET ORAL DAILY
Qty: 90 TABLET | Refills: 1 | OUTPATIENT
Start: 2024-09-04

## 2024-09-04 RX ORDER — FLUTICASONE PROPIONATE 50 MCG
2 SPRAY, SUSPENSION (ML) NASAL DAILY
Refills: 1 | OUTPATIENT
Start: 2024-09-04

## 2024-09-10 ENCOUNTER — TELEPHONE (OUTPATIENT)
Dept: FAMILY MEDICINE CLINIC | Facility: CLINIC | Age: 64
End: 2024-09-10

## 2024-09-10 DIAGNOSIS — J30.1 ALLERGIC RHINITIS DUE TO POLLEN, UNSPECIFIED SEASONALITY: ICD-10-CM

## 2024-09-10 RX ORDER — MONTELUKAST SODIUM 10 MG/1
10 TABLET ORAL DAILY
Qty: 90 TABLET | Refills: 1 | Status: SHIPPED | OUTPATIENT
Start: 2024-09-10 | End: 2024-09-10 | Stop reason: SDUPTHER

## 2024-09-10 RX ORDER — MONTELUKAST SODIUM 10 MG/1
10 TABLET ORAL DAILY
Qty: 90 TABLET | Refills: 1 | Status: SHIPPED | OUTPATIENT
Start: 2024-09-10

## 2024-09-17 ENCOUNTER — TELEPHONE (OUTPATIENT)
Age: 64
End: 2024-09-17

## 2024-10-10 DIAGNOSIS — G62.9 NEUROPATHY: ICD-10-CM

## 2024-10-10 RX ORDER — GABAPENTIN 300 MG/1
600 CAPSULE ORAL 2 TIMES DAILY
Qty: 360 CAPSULE | Refills: 0 | OUTPATIENT
Start: 2024-10-10 | End: 2025-01-08

## 2024-10-11 ENCOUNTER — TELEPHONE (OUTPATIENT)
Dept: FAMILY MEDICINE CLINIC | Facility: CLINIC | Age: 64
End: 2024-10-11

## 2024-10-11 DIAGNOSIS — G62.9 NEUROPATHY: ICD-10-CM

## 2024-10-11 RX ORDER — GABAPENTIN 300 MG/1
600 CAPSULE ORAL 2 TIMES DAILY
Qty: 360 CAPSULE | Refills: 1 | Status: SHIPPED | OUTPATIENT
Start: 2024-10-11 | End: 2024-11-20 | Stop reason: SDUPTHER

## 2024-10-29 NOTE — PROGRESS NOTES
reviewed and summarized as noted in the HPI, past history and data review sections of this note, and reviewed with patient. .       ASSESSMENT and PLAN    JW was seen today for 6 month follow-up.    Diagnoses and all orders for this visit:    Essential hypertension    Tachycardia induced cardiomyopathy (HCC)    PAF (paroxysmal atrial fibrillation) (HCC)    Stage 3b chronic kidney disease (HCC)    Tobacco use    Other orders  -     nicotine (NICODERM CQ) 14 MG/24HR; Place 1 patch onto the skin daily  -     nicotine (NICODERM CQ) 7 MG/24HR; Place 1 patch onto the skin daily  -     carvedilol (COREG) 25 MG tablet; Take 0.5 tablets by mouth 2 times daily (with meals) for 7 days, THEN 1 tablet 2 times daily (with meals).          IMPRESSION:    Add carvedilol 12.5 mg BID for one week, then 25 mg BID.  BP check in 4-6 weeks    Discussed smoking cessation, he would like some help but is motivated.  Begin with nicotine replacement.  Holding off wellbutrin/chantix for now with his prior afib but open to using if needed.  Moderate nicotine dependence, begin with 14 mg patch for 6 weeks, then 7 mg for a month, notify me if any difficulty in the interim    Keeping him on the low dose amiodarone for now but has follow up with EP in January when we hope that can be stopped.  He had prior admissions for GIB, Dr Cleaning was hoping to avoid procedures in light of that and eventually ablate for recurrence off meds.         Return in about 6 months (around 4/30/2025) for 50 VISIT 4-6 WEEKS FOR BP CHECK.          Thank you for allowing me to participate in this patient's care.  Please call or contact me if there are any questions or concerns regarding the above.      PAO GILMAN MD  10/30/24  2:44 PM

## 2024-10-30 ENCOUNTER — OFFICE VISIT (OUTPATIENT)
Age: 64
End: 2024-10-30
Payer: MEDICARE

## 2024-10-30 VITALS
WEIGHT: 224 LBS | DIASTOLIC BLOOD PRESSURE: 98 MMHG | SYSTOLIC BLOOD PRESSURE: 152 MMHG | HEIGHT: 74 IN | OXYGEN SATURATION: 98 % | BODY MASS INDEX: 28.75 KG/M2 | HEART RATE: 90 BPM

## 2024-10-30 DIAGNOSIS — R00.0 TACHYCARDIA INDUCED CARDIOMYOPATHY (HCC): Chronic | ICD-10-CM

## 2024-10-30 DIAGNOSIS — J30.1 ALLERGIC RHINITIS DUE TO POLLEN, UNSPECIFIED SEASONALITY: ICD-10-CM

## 2024-10-30 DIAGNOSIS — I48.0 PAF (PAROXYSMAL ATRIAL FIBRILLATION) (HCC): ICD-10-CM

## 2024-10-30 DIAGNOSIS — I10 ESSENTIAL HYPERTENSION: Primary | Chronic | ICD-10-CM

## 2024-10-30 DIAGNOSIS — E78.2 MIXED HYPERLIPIDEMIA: ICD-10-CM

## 2024-10-30 DIAGNOSIS — I43 TACHYCARDIA INDUCED CARDIOMYOPATHY (HCC): Chronic | ICD-10-CM

## 2024-10-30 DIAGNOSIS — Z72.0 TOBACCO USE: Chronic | ICD-10-CM

## 2024-10-30 DIAGNOSIS — N18.32 STAGE 3B CHRONIC KIDNEY DISEASE (HCC): Chronic | ICD-10-CM

## 2024-10-30 DIAGNOSIS — G47.00 INSOMNIA, UNSPECIFIED TYPE: ICD-10-CM

## 2024-10-30 PROCEDURE — 3080F DIAST BP >= 90 MM HG: CPT | Performed by: INTERNAL MEDICINE

## 2024-10-30 PROCEDURE — 99214 OFFICE O/P EST MOD 30 MIN: CPT | Performed by: INTERNAL MEDICINE

## 2024-10-30 PROCEDURE — 1123F ACP DISCUSS/DSCN MKR DOCD: CPT | Performed by: INTERNAL MEDICINE

## 2024-10-30 PROCEDURE — 3077F SYST BP >= 140 MM HG: CPT | Performed by: INTERNAL MEDICINE

## 2024-10-30 RX ORDER — TRAZODONE HYDROCHLORIDE 50 MG/1
50 TABLET, FILM COATED ORAL NIGHTLY
Qty: 90 TABLET | Refills: 1 | OUTPATIENT
Start: 2024-10-30

## 2024-10-30 RX ORDER — AMOXICILLIN 500 MG/1
500 CAPSULE ORAL 2 TIMES DAILY
COMMUNITY

## 2024-10-30 RX ORDER — CARVEDILOL 25 MG/1
TABLET ORAL
Qty: 180 TABLET | Refills: 3 | Status: SHIPPED | OUTPATIENT
Start: 2024-10-30 | End: 2025-11-06

## 2024-10-30 RX ORDER — BENZONATATE 100 MG/1
100 CAPSULE ORAL 3 TIMES DAILY PRN
COMMUNITY

## 2024-10-30 RX ORDER — ONDANSETRON 4 MG/1
4 TABLET, FILM COATED ORAL EVERY 8 HOURS PRN
COMMUNITY

## 2024-10-30 RX ORDER — ATORVASTATIN CALCIUM 20 MG/1
20 TABLET, FILM COATED ORAL DAILY
Qty: 90 TABLET | Refills: 1 | OUTPATIENT
Start: 2024-10-30

## 2024-10-30 RX ORDER — VALSARTAN 80 MG/1
80 TABLET ORAL DAILY
Qty: 90 TABLET | Refills: 3 | OUTPATIENT
Start: 2024-10-30

## 2024-10-30 RX ORDER — NICOTINE 21 MG/24HR
1 PATCH, TRANSDERMAL 24 HOURS TRANSDERMAL DAILY
Qty: 42 PATCH | Refills: 0 | Status: SHIPPED | OUTPATIENT
Start: 2024-10-30 | End: 2024-12-11

## 2024-10-30 RX ORDER — ATORVASTATIN CALCIUM 20 MG/1
20 TABLET, FILM COATED ORAL DAILY
Qty: 90 TABLET | Refills: 0 | Status: SHIPPED | OUTPATIENT
Start: 2024-10-30 | End: 2024-11-20 | Stop reason: SDUPTHER

## 2024-10-30 RX ORDER — FLUTICASONE PROPIONATE 50 MCG
2 SPRAY, SUSPENSION (ML) NASAL DAILY
Refills: 1 | OUTPATIENT
Start: 2024-10-30

## 2024-10-30 ASSESSMENT — ENCOUNTER SYMPTOMS: SHORTNESS OF BREATH: 0

## 2024-10-30 NOTE — TELEPHONE ENCOUNTER
Patient called in requesting a medication refill of Atorvastatin 20 mg    LDOS: 08.21.24  Next appt: 11.20.24    [facility]/pharmacy #[id number] - [place] - [address] - P [phone] - F [phone]  [address] [address]  Phone: [phone]  Fax: [phone]

## 2024-11-20 ENCOUNTER — OFFICE VISIT (OUTPATIENT)
Dept: FAMILY MEDICINE CLINIC | Facility: CLINIC | Age: 64
End: 2024-11-20
Payer: MEDICARE

## 2024-11-20 VITALS
HEART RATE: 60 BPM | SYSTOLIC BLOOD PRESSURE: 160 MMHG | OXYGEN SATURATION: 98 % | DIASTOLIC BLOOD PRESSURE: 100 MMHG | WEIGHT: 228.6 LBS | BODY MASS INDEX: 29.34 KG/M2 | HEIGHT: 74 IN

## 2024-11-20 DIAGNOSIS — E78.2 MIXED HYPERLIPIDEMIA: ICD-10-CM

## 2024-11-20 DIAGNOSIS — J30.1 ALLERGIC RHINITIS DUE TO POLLEN, UNSPECIFIED SEASONALITY: ICD-10-CM

## 2024-11-20 DIAGNOSIS — N18.32 STAGE 3B CHRONIC KIDNEY DISEASE (HCC): Chronic | ICD-10-CM

## 2024-11-20 DIAGNOSIS — D50.0 IRON DEFICIENCY ANEMIA DUE TO CHRONIC BLOOD LOSS: ICD-10-CM

## 2024-11-20 DIAGNOSIS — N18.32 STAGE 3B CHRONIC KIDNEY DISEASE (HCC): Primary | Chronic | ICD-10-CM

## 2024-11-20 DIAGNOSIS — G47.00 INSOMNIA, UNSPECIFIED TYPE: ICD-10-CM

## 2024-11-20 DIAGNOSIS — Z12.5 SCREENING FOR PROSTATE CANCER: ICD-10-CM

## 2024-11-20 DIAGNOSIS — G62.9 NEUROPATHY: ICD-10-CM

## 2024-11-20 LAB
ALBUMIN SERPL-MCNC: 3.2 G/DL (ref 3.2–4.6)
ALBUMIN/GLOB SERPL: 0.9 (ref 1–1.9)
ALP SERPL-CCNC: 71 U/L (ref 40–129)
ALT SERPL-CCNC: 14 U/L (ref 8–55)
ANION GAP SERPL CALC-SCNC: 7 MMOL/L (ref 7–16)
AST SERPL-CCNC: 26 U/L (ref 15–37)
BASOPHILS # BLD: 0.1 K/UL (ref 0–0.2)
BASOPHILS NFR BLD: 1 % (ref 0–2)
BILIRUB SERPL-MCNC: 0.4 MG/DL (ref 0–1.2)
BUN SERPL-MCNC: 18 MG/DL (ref 8–23)
CALCIUM SERPL-MCNC: 8.6 MG/DL (ref 8.8–10.2)
CHLORIDE SERPL-SCNC: 106 MMOL/L (ref 98–107)
CHOLEST SERPL-MCNC: 131 MG/DL (ref 0–200)
CO2 SERPL-SCNC: 28 MMOL/L (ref 20–29)
CREAT SERPL-MCNC: 1.92 MG/DL (ref 0.8–1.3)
DIFFERENTIAL METHOD BLD: ABNORMAL
EOSINOPHIL # BLD: 0.2 K/UL (ref 0–0.8)
EOSINOPHIL NFR BLD: 3 % (ref 0.5–7.8)
ERYTHROCYTE [DISTWIDTH] IN BLOOD BY AUTOMATED COUNT: 15.3 % (ref 11.9–14.6)
GLOBULIN SER CALC-MCNC: 3.6 G/DL (ref 2.3–3.5)
GLUCOSE SERPL-MCNC: 92 MG/DL (ref 70–99)
HCT VFR BLD AUTO: 33.7 % (ref 41.1–50.3)
HDLC SERPL-MCNC: 63 MG/DL (ref 40–60)
HDLC SERPL: 2.1 (ref 0–5)
HGB BLD-MCNC: 10.5 G/DL (ref 13.6–17.2)
IMM GRANULOCYTES # BLD AUTO: 0 K/UL (ref 0–0.5)
IMM GRANULOCYTES NFR BLD AUTO: 1 % (ref 0–5)
LDLC SERPL CALC-MCNC: 53 MG/DL (ref 0–100)
LYMPHOCYTES # BLD: 1.6 K/UL (ref 0.5–4.6)
LYMPHOCYTES NFR BLD: 25 % (ref 13–44)
MCH RBC QN AUTO: 32 PG (ref 26.1–32.9)
MCHC RBC AUTO-ENTMCNC: 31.2 G/DL (ref 31.4–35)
MCV RBC AUTO: 102.7 FL (ref 82–102)
MONOCYTES # BLD: 0.6 K/UL (ref 0.1–1.3)
MONOCYTES NFR BLD: 10 % (ref 4–12)
NEUTS SEG # BLD: 4 K/UL (ref 1.7–8.2)
NEUTS SEG NFR BLD: 60 % (ref 43–78)
NRBC # BLD: 0 K/UL (ref 0–0.2)
PLATELET # BLD AUTO: 268 K/UL (ref 150–450)
PMV BLD AUTO: 10.1 FL (ref 9.4–12.3)
POTASSIUM SERPL-SCNC: 4.2 MMOL/L (ref 3.5–5.1)
PROT SERPL-MCNC: 6.8 G/DL (ref 6.3–8.2)
PSA SERPL-MCNC: 4.4 NG/ML (ref 0–4)
RBC # BLD AUTO: 3.28 M/UL (ref 4.23–5.6)
SODIUM SERPL-SCNC: 141 MMOL/L (ref 136–145)
TRIGL SERPL-MCNC: 77 MG/DL (ref 0–150)
VLDLC SERPL CALC-MCNC: 15 MG/DL (ref 6–23)
WBC # BLD AUTO: 6.5 K/UL (ref 4.3–11.1)

## 2024-11-20 PROCEDURE — 3077F SYST BP >= 140 MM HG: CPT | Performed by: NURSE PRACTITIONER

## 2024-11-20 PROCEDURE — 99214 OFFICE O/P EST MOD 30 MIN: CPT | Performed by: NURSE PRACTITIONER

## 2024-11-20 PROCEDURE — 1123F ACP DISCUSS/DSCN MKR DOCD: CPT | Performed by: NURSE PRACTITIONER

## 2024-11-20 PROCEDURE — 3080F DIAST BP >= 90 MM HG: CPT | Performed by: NURSE PRACTITIONER

## 2024-11-20 RX ORDER — MONTELUKAST SODIUM 10 MG/1
10 TABLET ORAL DAILY
Qty: 90 TABLET | Refills: 1 | Status: SHIPPED | OUTPATIENT
Start: 2024-11-20

## 2024-11-20 RX ORDER — ATORVASTATIN CALCIUM 20 MG/1
20 TABLET, FILM COATED ORAL DAILY
Qty: 90 TABLET | Refills: 1 | Status: SHIPPED | OUTPATIENT
Start: 2024-11-20

## 2024-11-20 RX ORDER — TRAZODONE HYDROCHLORIDE 50 MG/1
50 TABLET, FILM COATED ORAL NIGHTLY
Qty: 90 TABLET | Refills: 1 | Status: SHIPPED | OUTPATIENT
Start: 2024-11-20

## 2024-11-20 RX ORDER — LORATADINE 10 MG/1
10 TABLET ORAL DAILY
Qty: 90 TABLET | Refills: 1 | Status: SHIPPED | OUTPATIENT
Start: 2024-11-20 | End: 2025-05-19

## 2024-11-20 RX ORDER — GABAPENTIN 300 MG/1
600 CAPSULE ORAL 2 TIMES DAILY
Qty: 360 CAPSULE | Refills: 1 | Status: SHIPPED | OUTPATIENT
Start: 2024-11-20 | End: 2025-05-19

## 2024-11-20 RX ORDER — FLUTICASONE PROPIONATE 50 MCG
2 SPRAY, SUSPENSION (ML) NASAL DAILY
Qty: 48 G | Refills: 1 | Status: SHIPPED | OUTPATIENT
Start: 2024-11-20

## 2024-11-20 ASSESSMENT — ENCOUNTER SYMPTOMS
VOMITING: 0
SHORTNESS OF BREATH: 0
SORE THROAT: 0
RESPIRATORY NEGATIVE: 1
ABDOMINAL PAIN: 0
COUGH: 0
NAUSEA: 1
BLOOD IN STOOL: 0

## 2024-11-20 ASSESSMENT — PATIENT HEALTH QUESTIONNAIRE - PHQ9
SUM OF ALL RESPONSES TO PHQ QUESTIONS 1-9: 0
1. LITTLE INTEREST OR PLEASURE IN DOING THINGS: NOT AT ALL
SUM OF ALL RESPONSES TO PHQ QUESTIONS 1-9: 0
SUM OF ALL RESPONSES TO PHQ9 QUESTIONS 1 & 2: 0
SUM OF ALL RESPONSES TO PHQ QUESTIONS 1-9: 0
2. FEELING DOWN, DEPRESSED OR HOPELESS: NOT AT ALL
SUM OF ALL RESPONSES TO PHQ QUESTIONS 1-9: 0

## 2024-11-20 NOTE — ASSESSMENT & PLAN NOTE
Chronic, at goal (stable), continue current treatment plan, medication adherence emphasized, and lifestyle modifications recommended    Orders:    atorvastatin (LIPITOR) 20 MG tablet; Take 1 tablet by mouth daily    CBC with Auto Differential; Future    Comprehensive Metabolic Panel; Future    Lipid Panel; Future

## 2024-11-20 NOTE — PROGRESS NOTES
normal.      Pupils: Pupils are equal, round, and reactive to light.   Cardiovascular:      Rate and Rhythm: Normal rate and regular rhythm.      Pulses: Normal pulses.      Heart sounds: Normal heart sounds.   Pulmonary:      Effort: Pulmonary effort is normal.      Breath sounds: Normal breath sounds.   Abdominal:      General: Abdomen is flat.      Palpations: Abdomen is soft.   Musculoskeletal:         General: Normal range of motion.      Cervical back: Normal range of motion and neck supple.   Skin:     General: Skin is warm and dry.   Neurological:      General: No focal deficit present.      Mental Status: He is alert.      Cranial Nerves: No cranial nerve deficit.      Motor: No weakness.   Psychiatric:         Mood and Affect: Mood normal.         Thought Content: Thought content normal.         Judgment: Judgment normal.              An electronic signature was used to authenticate this note.    --Josefa Preston, GORAN - CNP

## 2024-11-20 NOTE — ASSESSMENT & PLAN NOTE
Chronic, not at goal (unstable), lifestyle modifications recommended    Orders:    Comprehensive Metabolic Panel; Future

## 2024-11-21 ENCOUNTER — TELEPHONE (OUTPATIENT)
Dept: FAMILY MEDICINE CLINIC | Facility: CLINIC | Age: 64
End: 2024-11-21

## 2024-11-21 RX ORDER — ONDANSETRON 4 MG/1
4 TABLET, FILM COATED ORAL EVERY 8 HOURS PRN
Qty: 30 TABLET | Refills: 0 | Status: SHIPPED | OUTPATIENT
Start: 2024-11-21

## 2024-11-21 RX ORDER — ONDANSETRON 4 MG/1
4 TABLET, FILM COATED ORAL EVERY 8 HOURS PRN
Qty: 30 TABLET | Refills: 0 | Status: SHIPPED | OUTPATIENT
Start: 2024-11-21 | End: 2024-11-21

## 2024-11-21 NOTE — TELEPHONE ENCOUNTER
Hi!    Mr. Valdes called that his pharmacy did not receive his stomach medicine, he was suppose to get a new refill from his visit yesterday.      He said it was for his vomiting and nausea due to his sinuses making his stomach hurt.    ZOFRAN

## 2024-12-02 ENCOUNTER — NURSE ONLY (OUTPATIENT)
Age: 64
End: 2024-12-02

## 2024-12-02 VITALS — SYSTOLIC BLOOD PRESSURE: 196 MMHG | DIASTOLIC BLOOD PRESSURE: 102 MMHG

## 2024-12-02 DIAGNOSIS — I10 ESSENTIAL HYPERTENSION: Primary | Chronic | ICD-10-CM

## 2024-12-02 DIAGNOSIS — N18.32 STAGE 3B CHRONIC KIDNEY DISEASE (HCC): Chronic | ICD-10-CM

## 2024-12-02 NOTE — TELEPHONE ENCOUNTER
Requested Prescriptions     Pending Prescriptions Disp Refills    sacubitril-valsartan (ENTRESTO) 49-51 MG per tablet 60 tablet 5     Sig: Take 1 tablet by mouth 2 times daily

## 2024-12-02 NOTE — PROGRESS NOTES
BP nurse visit. Pt BP is 190/98 then 196/102. Per Dr. Sandoval pt should try to stop nicotine use. Pt is to start entresto 49-51 mg BID and d/c valsartan. Pt is to come back in two weeks for a nurse visit and BMP check.

## 2024-12-04 DIAGNOSIS — I10 ESSENTIAL HYPERTENSION: Chronic | ICD-10-CM

## 2024-12-04 NOTE — TELEPHONE ENCOUNTER
Requested Prescriptions     Pending Prescriptions Disp Refills    sacubitril-valsartan (ENTRESTO) 49-51 MG per tablet 60 tablet 11     Sig: Take 1 tablet by mouth 2 times daily     Sent to wrong pharmacy 12/2/24

## 2024-12-04 NOTE — TELEPHONE ENCOUNTER
Pt called in stating he requested refills of his entresto on 12/2 and the pharmacy let him know they have not received the refills see note from 12/2

## 2025-01-14 ENCOUNTER — OFFICE VISIT (OUTPATIENT)
Age: 65
End: 2025-01-14

## 2025-01-14 VITALS
BODY MASS INDEX: 29.52 KG/M2 | SYSTOLIC BLOOD PRESSURE: 172 MMHG | HEART RATE: 56 BPM | DIASTOLIC BLOOD PRESSURE: 90 MMHG | HEIGHT: 74 IN | WEIGHT: 230 LBS

## 2025-01-14 DIAGNOSIS — R00.0 TACHYCARDIA INDUCED CARDIOMYOPATHY (HCC): ICD-10-CM

## 2025-01-14 DIAGNOSIS — I10 ESSENTIAL HYPERTENSION: Chronic | ICD-10-CM

## 2025-01-14 DIAGNOSIS — I48.19 PERSISTENT ATRIAL FIBRILLATION (HCC): ICD-10-CM

## 2025-01-14 DIAGNOSIS — I43 TACHYCARDIA INDUCED CARDIOMYOPATHY (HCC): ICD-10-CM

## 2025-01-14 DIAGNOSIS — I48.0 PAF (PAROXYSMAL ATRIAL FIBRILLATION) (HCC): ICD-10-CM

## 2025-01-14 DIAGNOSIS — I48.0 PAROXYSMAL ATRIAL FIBRILLATION (HCC): Primary | ICD-10-CM

## 2025-01-14 RX ORDER — AMIODARONE HYDROCHLORIDE 100 MG/1
100 TABLET ORAL DAILY
Qty: 90 TABLET | Refills: 3 | Status: SHIPPED | OUTPATIENT
Start: 2025-01-14

## 2025-01-14 ASSESSMENT — ENCOUNTER SYMPTOMS
GASTROINTESTINAL NEGATIVE: 1
SHORTNESS OF BREATH: 0
EYES NEGATIVE: 1
ALLERGIC/IMMUNOLOGIC NEGATIVE: 1

## 2025-01-14 NOTE — PROGRESS NOTES
[facility] CARDIOLOGY  [address] [place] [address], [address]  [place] [address]  PHONE: [phone]        25    NAME:  MINH Valdes  : 1960  MRN: [id number]     Referring Cardiologist: Jasvir Johnson DO and Carmen Morrow MD    Reason for Consultation: Atrial fibrillation    ASSESSMENT and PLAN:  Persistent atrial fibrillation  NICM, thought 2/2 TICM, EF 50-55% 2023  CAD  CKD    64 year old male with a history of persistent AF and NICM, mild CKD. Admitted in 2023 with AF with RVR.     -AF - Stable, discussed options, will continue metoprolol (high dose) and Xarelto. S/p EMILY/DCCV in 2023. Remains in NSR. Has been in rhythm for several months now. Will reduce amiodarone to 100 mg po daily, goal is to eventually stop given long term risk for side effects. Favor ablation when/if AF burden increases. Discussed with pt today. Will hold off ablation for now given recent admission for severe anemia.   -Biannual TFTs, LFTs, yearly PFTs and eye exams while on amiodarone.    -Reduce amio to 100 mg po daily today.     -Stroke ppx - continue Xarelto for now, consider transtion to Eliquis vs Watchman if recurrent bleeding.     -CAD - Stable, continue OMT.    -ABLA - stable, no further bleeding, consider Watchman if recurrent bleeding.    -CBC 2024 with anemia, but significantly improved. Continue to follow up.     -CKD - Stable. Careful assessment of renal function. Reviewed from 2024, Cr 1.92.    -NICM - EF low normal range earlier this year, but severely depressed previously, likely TICM.   -Repeat echo this year.     -Routine cardiac care per Dr. Morrow    -EP Follow up in 6 months or PRN, see PA and myself alternating.    -Referral to [facility].     AF ABLATION EDUCATION (done today):  I discussed with the patient the pathophysiology of atrial fibrillation, including details about potential triggers from the pulmonary veins (PVs), as well as non-PV sites.  We also discussed that in certain

## 2025-01-17 RX ORDER — VALSARTAN 80 MG/1
80 TABLET ORAL DAILY
Qty: 90 TABLET | Refills: 3 | Status: SHIPPED | OUTPATIENT
Start: 2025-01-17

## 2025-01-20 RX ORDER — ONDANSETRON 4 MG/1
4 TABLET, FILM COATED ORAL EVERY 8 HOURS PRN
Qty: 30 TABLET | Refills: 0 | OUTPATIENT
Start: 2025-01-20

## 2025-03-11 ENCOUNTER — OFFICE VISIT (OUTPATIENT)
Dept: FAMILY MEDICINE CLINIC | Facility: CLINIC | Age: 65
End: 2025-03-11
Payer: MEDICARE

## 2025-03-11 VITALS
BODY MASS INDEX: 29.45 KG/M2 | SYSTOLIC BLOOD PRESSURE: 120 MMHG | HEART RATE: 65 BPM | OXYGEN SATURATION: 98 % | RESPIRATION RATE: 17 BRPM | HEIGHT: 74 IN | WEIGHT: 229.5 LBS | TEMPERATURE: 97.4 F | DIASTOLIC BLOOD PRESSURE: 80 MMHG

## 2025-03-11 DIAGNOSIS — R09.89 CHEST CONGESTION: ICD-10-CM

## 2025-03-11 DIAGNOSIS — J34.89 STUFFY AND RUNNY NOSE: Primary | ICD-10-CM

## 2025-03-11 DIAGNOSIS — R05.1 ACUTE COUGH: ICD-10-CM

## 2025-03-11 PROCEDURE — G8419 CALC BMI OUT NRM PARAM NOF/U: HCPCS | Performed by: FAMILY MEDICINE

## 2025-03-11 PROCEDURE — 3017F COLORECTAL CA SCREEN DOC REV: CPT | Performed by: FAMILY MEDICINE

## 2025-03-11 PROCEDURE — 99213 OFFICE O/P EST LOW 20 MIN: CPT | Performed by: FAMILY MEDICINE

## 2025-03-11 PROCEDURE — 1123F ACP DISCUSS/DSCN MKR DOCD: CPT | Performed by: FAMILY MEDICINE

## 2025-03-11 PROCEDURE — 3074F SYST BP LT 130 MM HG: CPT | Performed by: FAMILY MEDICINE

## 2025-03-11 PROCEDURE — 4004F PT TOBACCO SCREEN RCVD TLK: CPT | Performed by: FAMILY MEDICINE

## 2025-03-11 PROCEDURE — 3079F DIAST BP 80-89 MM HG: CPT | Performed by: FAMILY MEDICINE

## 2025-03-11 PROCEDURE — G8427 DOCREV CUR MEDS BY ELIG CLIN: HCPCS | Performed by: FAMILY MEDICINE

## 2025-03-11 RX ORDER — BENZONATATE 100 MG/1
100 CAPSULE ORAL 3 TIMES DAILY PRN
Qty: 30 CAPSULE | Refills: 0 | Status: SHIPPED | OUTPATIENT
Start: 2025-03-11 | End: 2025-03-21

## 2025-03-11 RX ORDER — BROMPHENIRAMINE MALEATE, PSEUDOEPHEDRINE HYDROCHLORIDE, AND DEXTROMETHORPHAN HYDROBROMIDE 2; 30; 10 MG/5ML; MG/5ML; MG/5ML
5 SYRUP ORAL 4 TIMES DAILY PRN
Qty: 100 ML | Refills: 0 | Status: SHIPPED | OUTPATIENT
Start: 2025-03-11 | End: 2025-03-16

## 2025-03-11 RX ORDER — GUAIFENESIN 600 MG/1
600 TABLET, EXTENDED RELEASE ORAL 2 TIMES DAILY
Qty: 10 TABLET | Refills: 0 | Status: SHIPPED | OUTPATIENT
Start: 2025-03-11 | End: 2025-03-16

## 2025-03-11 ASSESSMENT — PATIENT HEALTH QUESTIONNAIRE - PHQ9
SUM OF ALL RESPONSES TO PHQ QUESTIONS 1-9: 0
2. FEELING DOWN, DEPRESSED OR HOPELESS: NOT AT ALL
1. LITTLE INTEREST OR PLEASURE IN DOING THINGS: NOT AT ALL

## 2025-03-11 NOTE — PROGRESS NOTES
MINH Valdes (:  1960) is a 64 y.o. male,Established patient, here for evaluation of the following chief complaint(s):  Pharyngitis, Cough, and Congestion    Assessment & Plan  Stuffy and runny nose   Acute condition, new, Supportive care with appropriate antipyretics and fluids.    Orders:    brompheniramine-pseudoephedrine-DM 2-30-10 MG/5ML syrup; Take 5 mLs by mouth 4 times daily as needed for Cough or Congestion    Acute cough  - Recommended scheduled over-the-counter ibuprofen/Tylenol every 8 hours for the next 2 to 3 days to treat headaches, sore throat, body aches.    - Adequate hydration with water and electrolytes.  For cough, recommended throat lozenges (Cepacol), warm beverages like tea or broth.  Salt water gargles.  Steam inhalation.   -Given patient's wet productive cough, will also prescribe some guaifenesin   -Slow deep breaths with relaxation techniques, will also prescribe low-dose Symbicort given the quick and long action of formoterol for symptom relief from respiratory issues  -Return precautions given for any worsening of symptoms, Persistent pain or pressure in the chest, signs of confusion, lips or face turning blue, increased work of breathing, fever that does not resolve with over-the-counter medications or other symptoms.    Orders:    benzonatate (TESSALON) 100 MG capsule; Take 1 capsule by mouth 3 times daily as needed for Cough    Chest congestion   Acute condition, new, Supportive care with appropriate antipyretics and fluids.    Orders:    guaiFENesin (MUCINEX) 600 MG extended release tablet; Take 1 tablet by mouth 2 times daily for 5 days      Subjective   HPI  MINH Valdes (:  1960) is a 64 y.o. male,Established patient, here for evaluation of the following chief complaint(s): Pharyngitis, Cough, and Congestion  Ongoing for 2 days. Stuffy and runny nose, productive cough, watery eyes. No fevers chills or other symptoms. He reports that he sometimes swallows the mucous

## 2025-03-26 DIAGNOSIS — G47.00 INSOMNIA, UNSPECIFIED TYPE: ICD-10-CM

## 2025-03-26 RX ORDER — TRAZODONE HYDROCHLORIDE 50 MG/1
50 TABLET ORAL NIGHTLY
Qty: 90 TABLET | Refills: 0 | Status: SHIPPED | OUTPATIENT
Start: 2025-03-26 | End: 2025-05-21 | Stop reason: SDUPTHER

## 2025-04-04 ENCOUNTER — TELEPHONE (OUTPATIENT)
Dept: FAMILY MEDICINE CLINIC | Facility: CLINIC | Age: 65
End: 2025-04-04

## 2025-04-04 NOTE — TELEPHONE ENCOUNTER
Pt. Called in wanting to know if you will refill his Xarelto 20 mg and send to [facility]

## 2025-04-29 NOTE — PROGRESS NOTES
11/20/24  Yes Josefa Preston APRN - CNP   ferrous fumarate-vitamin c (JORDI-SEQUELS) 65-25 MG TBCR CR tablet Take 1 tablet by mouth daily (with breakfast) 11/20/24  Yes Josefa Preston APRN - CNP   atorvastatin (LIPITOR) 20 MG tablet Take 1 tablet by mouth daily 11/20/24  Yes Josefa Preston APRN - CNP   carvedilol (COREG) 25 MG tablet Take 0.5 tablets by mouth 2 times daily (with meals) for 7 days, THEN 1 tablet 2 times daily (with meals). 10/30/24 11/6/25 Yes Carmen Morrow MD   esomeprazole (NEXIUM) 20 MG delayed release capsule Take 1 capsule by mouth daily 3/10/21  Yes Automatic Reconciliation, Ar   nicotine (NICODERM CQ) 14 MG/24HR Place 1 patch onto the skin daily 10/30/24 1/14/25  Carmen Morrow MD   nicotine (NICODERM CQ) 7 MG/24HR Place 1 patch onto the skin daily  Patient not taking: Reported on 11/20/2024 12/4/24 1/3/25  Carmen Morrow MD   ferrous sulfate (IRON 325) 325 (65 Fe) MG tablet Take 1 tablet by mouth daily (with breakfast) 6/6/24 7/6/24  Bayron Hollis MD     Allergies   Allergen Reactions    Morphine Itching    Caffeine Palpitations     Effects Heart rhythm     Past Medical History:   Diagnosis Date    Arthritis     Avascular necrosis of bones of both hips (HCC)     Avascular necrosis of humeral head (HCC)     Chronic foot pain     Chronic left hip pain     Chronic pain     Emphysema     Fatty infiltration of liver     GERD (gastroesophageal reflux disease)     H/O supraventricular tachycardia     History of bilateral hip replacements     History of intracranial aneurysm     History of right shoulder replacement     Hyperesthesia     Hypertension     Mixed hyperlipidemia     Patellar tendon rupture 1987    Peripheral neuropathy     Status post right shoulder hemiarthroplasty      Past Surgical History:   Procedure Laterality Date    CARDIAC PROCEDURE N/A 12/12/2022    Left heart cath / coronary angiography performed by Hernan Caldwell MD at

## 2025-04-30 ENCOUNTER — OFFICE VISIT (OUTPATIENT)
Age: 65
End: 2025-04-30
Payer: MEDICARE

## 2025-04-30 VITALS
SYSTOLIC BLOOD PRESSURE: 136 MMHG | HEIGHT: 74 IN | WEIGHT: 228 LBS | BODY MASS INDEX: 29.26 KG/M2 | DIASTOLIC BLOOD PRESSURE: 70 MMHG | HEART RATE: 60 BPM

## 2025-04-30 DIAGNOSIS — Z79.899 AT RISK FOR AMIODARONE TOXICITY WITH LONG TERM USE: ICD-10-CM

## 2025-04-30 DIAGNOSIS — Z91.89 AT RISK FOR AMIODARONE TOXICITY WITH LONG TERM USE: ICD-10-CM

## 2025-04-30 DIAGNOSIS — I48.0 PAF (PAROXYSMAL ATRIAL FIBRILLATION) (HCC): ICD-10-CM

## 2025-04-30 DIAGNOSIS — I10 ESSENTIAL HYPERTENSION: Chronic | ICD-10-CM

## 2025-04-30 DIAGNOSIS — K92.2 LOWER GI BLEED: ICD-10-CM

## 2025-04-30 DIAGNOSIS — N18.32 STAGE 3B CHRONIC KIDNEY DISEASE (HCC): Chronic | ICD-10-CM

## 2025-04-30 DIAGNOSIS — I42.8 TACHYCARDIA INDUCED CARDIOMYOPATHY (HCC): Chronic | ICD-10-CM

## 2025-04-30 DIAGNOSIS — D50.0 IRON DEFICIENCY ANEMIA DUE TO CHRONIC BLOOD LOSS: ICD-10-CM

## 2025-04-30 DIAGNOSIS — I10 ESSENTIAL HYPERTENSION: Primary | Chronic | ICD-10-CM

## 2025-04-30 DIAGNOSIS — Z72.0 TOBACCO USE: Chronic | ICD-10-CM

## 2025-04-30 LAB
ANION GAP SERPL CALC-SCNC: 10 MMOL/L (ref 7–16)
BUN SERPL-MCNC: 19 MG/DL (ref 8–23)
CALCIUM SERPL-MCNC: 8.4 MG/DL (ref 8.8–10.2)
CHLORIDE SERPL-SCNC: 107 MMOL/L (ref 98–107)
CO2 SERPL-SCNC: 26 MMOL/L (ref 20–29)
CREAT SERPL-MCNC: 1.77 MG/DL (ref 0.8–1.3)
FERRITIN SERPL-MCNC: 85 NG/ML (ref 8–388)
GLUCOSE SERPL-MCNC: 108 MG/DL (ref 70–99)
IRON SERPL-MCNC: 47 UG/DL (ref 35–100)
POTASSIUM SERPL-SCNC: 3.7 MMOL/L (ref 3.5–5.1)
SODIUM SERPL-SCNC: 142 MMOL/L (ref 136–145)
TSH, 3RD GENERATION: 2.64 UIU/ML (ref 0.27–4.2)

## 2025-04-30 PROCEDURE — 3075F SYST BP GE 130 - 139MM HG: CPT | Performed by: INTERNAL MEDICINE

## 2025-04-30 PROCEDURE — G8419 CALC BMI OUT NRM PARAM NOF/U: HCPCS | Performed by: INTERNAL MEDICINE

## 2025-04-30 PROCEDURE — 1123F ACP DISCUSS/DSCN MKR DOCD: CPT | Performed by: INTERNAL MEDICINE

## 2025-04-30 PROCEDURE — G8427 DOCREV CUR MEDS BY ELIG CLIN: HCPCS | Performed by: INTERNAL MEDICINE

## 2025-04-30 PROCEDURE — 4004F PT TOBACCO SCREEN RCVD TLK: CPT | Performed by: INTERNAL MEDICINE

## 2025-04-30 PROCEDURE — 99214 OFFICE O/P EST MOD 30 MIN: CPT | Performed by: INTERNAL MEDICINE

## 2025-04-30 PROCEDURE — 3017F COLORECTAL CA SCREEN DOC REV: CPT | Performed by: INTERNAL MEDICINE

## 2025-04-30 PROCEDURE — 3078F DIAST BP <80 MM HG: CPT | Performed by: INTERNAL MEDICINE

## 2025-04-30 ASSESSMENT — ENCOUNTER SYMPTOMS: SHORTNESS OF BREATH: 0

## 2025-04-30 NOTE — PATIENT INSTRUCTIONS
[unfilled]       Learning About the Mediterranean Diet  What is the Mediterranean diet?     The Mediterranean diet is a style of eating rather than a diet plan. It features foods eaten in [place], [place], southern [place] and [place], and other countries along the [place] Sea. It emphasizes eating foods like fish, fruits, vegetables, beans, high-fiber breads and whole grains, nuts, and olive oil. This style of eating includes limited red meat, cheese, and sweets.  Why choose the Mediterranean diet?  A Mediterranean-style diet may improve heart health. It contains more fat than other heart-healthy diets. But the fats are mainly from nuts, unsaturated oils (such as fish oils and olive oil), and certain nut or seed oils (such as canola, soybean, or flaxseed oil). These fats may help protect the heart and blood vessels.  How can you get started on the Mediterranean diet?  Here are some things you can do to switch to a more Mediterranean way of eating.  What to eat  Eat a variety of fruits and vegetables each day, such as grapes, blueberries, tomatoes, broccoli, peppers, figs, olives, spinach, eggplant, beans, lentils, and chickpeas.  Eat a variety of whole-grain foods each day, such as oats, brown rice, and whole wheat bread, pasta, and couscous.  Eat fish at least 2 times a week. Try tuna, salmon, mackerel, lake trout, herring, or sardines.  Eat moderate amounts of low-fat dairy products, such as milk, cheese, or yogurt.  Eat moderate amounts of poultry and eggs.  Choose healthy (unsaturated) fats, such as nuts, olive oil, and certain nut or seed oils like canola, soybean, and flaxseed.  Limit unhealthy (saturated) fats, such as butter, palm oil, and coconut oil. And limit fats found in animal products, such as meat and dairy products made with whole milk. Try to eat red meat only a few times a month in very small amounts.  Limit sweets and desserts to only a few times a week. This includes sugar-sweetened drinks

## 2025-05-02 ENCOUNTER — RESULTS FOLLOW-UP (OUTPATIENT)
Dept: FAMILY MEDICINE CLINIC | Facility: CLINIC | Age: 65
End: 2025-05-02

## 2025-05-21 ENCOUNTER — OFFICE VISIT (OUTPATIENT)
Dept: FAMILY MEDICINE CLINIC | Facility: CLINIC | Age: 65
End: 2025-05-21
Payer: MEDICARE

## 2025-05-21 VITALS
HEART RATE: 69 BPM | HEIGHT: 74 IN | SYSTOLIC BLOOD PRESSURE: 138 MMHG | WEIGHT: 226 LBS | BODY MASS INDEX: 29 KG/M2 | TEMPERATURE: 97.5 F | RESPIRATION RATE: 18 BRPM | DIASTOLIC BLOOD PRESSURE: 72 MMHG | OXYGEN SATURATION: 96 %

## 2025-05-21 DIAGNOSIS — N18.32 STAGE 3B CHRONIC KIDNEY DISEASE (HCC): ICD-10-CM

## 2025-05-21 DIAGNOSIS — Z12.5 SCREENING FOR PROSTATE CANCER: ICD-10-CM

## 2025-05-21 DIAGNOSIS — Z13.6 SCREENING FOR AAA (ABDOMINAL AORTIC ANEURYSM): ICD-10-CM

## 2025-05-21 DIAGNOSIS — G47.00 INSOMNIA, UNSPECIFIED TYPE: ICD-10-CM

## 2025-05-21 DIAGNOSIS — Z87.891 PERSONAL HISTORY OF TOBACCO USE, PRESENTING HAZARDS TO HEALTH: ICD-10-CM

## 2025-05-21 DIAGNOSIS — Z87.891 PERSONAL HISTORY OF TOBACCO USE: ICD-10-CM

## 2025-05-21 DIAGNOSIS — H93.13 TINNITUS OF BOTH EARS: ICD-10-CM

## 2025-05-21 DIAGNOSIS — J30.1 ALLERGIC RHINITIS DUE TO POLLEN, UNSPECIFIED SEASONALITY: ICD-10-CM

## 2025-05-21 DIAGNOSIS — G62.9 NEUROPATHY: ICD-10-CM

## 2025-05-21 DIAGNOSIS — D50.0 IRON DEFICIENCY ANEMIA DUE TO CHRONIC BLOOD LOSS: ICD-10-CM

## 2025-05-21 DIAGNOSIS — Z00.00 MEDICARE ANNUAL WELLNESS VISIT, SUBSEQUENT: Primary | ICD-10-CM

## 2025-05-21 DIAGNOSIS — E78.2 MIXED HYPERLIPIDEMIA: ICD-10-CM

## 2025-05-21 DIAGNOSIS — N52.9 ERECTILE DYSFUNCTION, UNSPECIFIED ERECTILE DYSFUNCTION TYPE: ICD-10-CM

## 2025-05-21 DIAGNOSIS — Z71.89 ACP (ADVANCE CARE PLANNING): ICD-10-CM

## 2025-05-21 LAB
ALBUMIN SERPL-MCNC: 3.2 G/DL (ref 3.2–4.6)
ALBUMIN/GLOB SERPL: 0.9 (ref 1–1.9)
ALP SERPL-CCNC: 65 U/L (ref 40–129)
ALT SERPL-CCNC: 11 U/L (ref 8–55)
ANION GAP SERPL CALC-SCNC: 9 MMOL/L (ref 7–16)
AST SERPL-CCNC: 19 U/L (ref 15–37)
BASOPHILS # BLD: 0.04 K/UL (ref 0–0.2)
BASOPHILS NFR BLD: 0.8 % (ref 0–2)
BILIRUB SERPL-MCNC: 0.4 MG/DL (ref 0–1.2)
BUN SERPL-MCNC: 16 MG/DL (ref 8–23)
CALCIUM SERPL-MCNC: 8.8 MG/DL (ref 8.8–10.2)
CHLORIDE SERPL-SCNC: 107 MMOL/L (ref 98–107)
CHOLEST SERPL-MCNC: 130 MG/DL (ref 0–200)
CO2 SERPL-SCNC: 26 MMOL/L (ref 20–29)
CREAT SERPL-MCNC: 1.55 MG/DL (ref 0.8–1.3)
DIFFERENTIAL METHOD BLD: ABNORMAL
EOSINOPHIL # BLD: 0.13 K/UL (ref 0–0.8)
EOSINOPHIL NFR BLD: 2.5 % (ref 0.5–7.8)
ERYTHROCYTE [DISTWIDTH] IN BLOOD BY AUTOMATED COUNT: 13.2 % (ref 11.9–14.6)
GLOBULIN SER CALC-MCNC: 3.5 G/DL (ref 2.3–3.5)
GLUCOSE SERPL-MCNC: 104 MG/DL (ref 70–99)
HCT VFR BLD AUTO: 36.4 % (ref 41.1–50.3)
HDLC SERPL-MCNC: 58 MG/DL (ref 40–60)
HDLC SERPL: 2.2 (ref 0–5)
HGB BLD-MCNC: 11.9 G/DL (ref 13.6–17.2)
IMM GRANULOCYTES # BLD AUTO: 0.01 K/UL (ref 0–0.5)
IMM GRANULOCYTES NFR BLD AUTO: 0.2 % (ref 0–5)
LDLC SERPL CALC-MCNC: 55 MG/DL (ref 0–100)
LYMPHOCYTES # BLD: 1.28 K/UL (ref 0.5–4.6)
LYMPHOCYTES NFR BLD: 24.5 % (ref 13–44)
MCH RBC QN AUTO: 32.2 PG (ref 26.1–32.9)
MCHC RBC AUTO-ENTMCNC: 32.7 G/DL (ref 31.4–35)
MCV RBC AUTO: 98.6 FL (ref 82–102)
MONOCYTES # BLD: 0.52 K/UL (ref 0.1–1.3)
MONOCYTES NFR BLD: 9.9 % (ref 4–12)
NEUTS SEG # BLD: 3.25 K/UL (ref 1.7–8.2)
NEUTS SEG NFR BLD: 62.1 % (ref 43–78)
NRBC # BLD: 0 K/UL (ref 0–0.2)
PLATELET # BLD AUTO: 215 K/UL (ref 150–450)
PMV BLD AUTO: 10.9 FL (ref 9.4–12.3)
POTASSIUM SERPL-SCNC: 3.9 MMOL/L (ref 3.5–5.1)
PROT SERPL-MCNC: 6.7 G/DL (ref 6.3–8.2)
PSA SERPL-MCNC: 2.9 NG/ML (ref 0–4)
RBC # BLD AUTO: 3.69 M/UL (ref 4.23–5.6)
SODIUM SERPL-SCNC: 142 MMOL/L (ref 136–145)
TRIGL SERPL-MCNC: 84 MG/DL (ref 0–150)
VLDLC SERPL CALC-MCNC: 17 MG/DL (ref 6–23)
WBC # BLD AUTO: 5.2 K/UL (ref 4.3–11.1)

## 2025-05-21 PROCEDURE — G0296 VISIT TO DETERM LDCT ELIG: HCPCS | Performed by: NURSE PRACTITIONER

## 2025-05-21 PROCEDURE — 4004F PT TOBACCO SCREEN RCVD TLK: CPT | Performed by: NURSE PRACTITIONER

## 2025-05-21 PROCEDURE — 1123F ACP DISCUSS/DSCN MKR DOCD: CPT | Performed by: NURSE PRACTITIONER

## 2025-05-21 PROCEDURE — 99406 BEHAV CHNG SMOKING 3-10 MIN: CPT | Performed by: NURSE PRACTITIONER

## 2025-05-21 PROCEDURE — G8427 DOCREV CUR MEDS BY ELIG CLIN: HCPCS | Performed by: NURSE PRACTITIONER

## 2025-05-21 PROCEDURE — 99214 OFFICE O/P EST MOD 30 MIN: CPT | Performed by: NURSE PRACTITIONER

## 2025-05-21 PROCEDURE — G8419 CALC BMI OUT NRM PARAM NOF/U: HCPCS | Performed by: NURSE PRACTITIONER

## 2025-05-21 PROCEDURE — 3075F SYST BP GE 130 - 139MM HG: CPT | Performed by: NURSE PRACTITIONER

## 2025-05-21 PROCEDURE — G2211 COMPLEX E/M VISIT ADD ON: HCPCS | Performed by: NURSE PRACTITIONER

## 2025-05-21 PROCEDURE — G0439 PPPS, SUBSEQ VISIT: HCPCS | Performed by: NURSE PRACTITIONER

## 2025-05-21 PROCEDURE — 3017F COLORECTAL CA SCREEN DOC REV: CPT | Performed by: NURSE PRACTITIONER

## 2025-05-21 PROCEDURE — 3078F DIAST BP <80 MM HG: CPT | Performed by: NURSE PRACTITIONER

## 2025-05-21 RX ORDER — MONTELUKAST SODIUM 10 MG/1
10 TABLET ORAL DAILY
Qty: 90 TABLET | Refills: 1 | Status: SHIPPED | OUTPATIENT
Start: 2025-05-21

## 2025-05-21 RX ORDER — ONDANSETRON 4 MG/1
4 TABLET, FILM COATED ORAL EVERY 8 HOURS PRN
Qty: 30 TABLET | Refills: 0 | Status: SHIPPED | OUTPATIENT
Start: 2025-05-21

## 2025-05-21 RX ORDER — NICOTINE 21 MG/24HR
1 PATCH, TRANSDERMAL 24 HOURS TRANSDERMAL DAILY
Qty: 42 PATCH | Refills: 0 | Status: SHIPPED | OUTPATIENT
Start: 2025-05-21 | End: 2025-07-02

## 2025-05-21 RX ORDER — ATORVASTATIN CALCIUM 20 MG/1
20 TABLET, FILM COATED ORAL DAILY
Qty: 90 TABLET | Refills: 1 | Status: SHIPPED | OUTPATIENT
Start: 2025-05-21

## 2025-05-21 RX ORDER — SILDENAFIL 50 MG/1
50 TABLET, FILM COATED ORAL PRN
Qty: 30 TABLET | Refills: 3 | Status: SHIPPED | OUTPATIENT
Start: 2025-05-21

## 2025-05-21 RX ORDER — FLUTICASONE PROPIONATE 50 MCG
2 SPRAY, SUSPENSION (ML) NASAL DAILY
Qty: 48 G | Refills: 1 | Status: SHIPPED | OUTPATIENT
Start: 2025-05-21

## 2025-05-21 RX ORDER — GABAPENTIN 300 MG/1
600 CAPSULE ORAL 2 TIMES DAILY
Qty: 360 CAPSULE | Refills: 1 | Status: SHIPPED | OUTPATIENT
Start: 2025-05-21 | End: 2025-11-17

## 2025-05-21 RX ORDER — TRAZODONE HYDROCHLORIDE 50 MG/1
50 TABLET ORAL NIGHTLY
Qty: 90 TABLET | Refills: 1 | Status: SHIPPED | OUTPATIENT
Start: 2025-05-21

## 2025-05-21 SDOH — ECONOMIC STABILITY: FOOD INSECURITY: WITHIN THE PAST 12 MONTHS, THE FOOD YOU BOUGHT JUST DIDN'T LAST AND YOU DIDN'T HAVE MONEY TO GET MORE.: NEVER TRUE

## 2025-05-21 SDOH — ECONOMIC STABILITY: TRANSPORTATION INSECURITY
IN THE PAST 12 MONTHS, HAS LACK OF TRANSPORTATION KEPT YOU FROM MEETINGS, WORK, OR FROM GETTING THINGS NEEDED FOR DAILY LIVING?: NO

## 2025-05-21 SDOH — HEALTH STABILITY: PHYSICAL HEALTH: ON AVERAGE, HOW MANY MINUTES DO YOU ENGAGE IN EXERCISE AT THIS LEVEL?: 30 MIN

## 2025-05-21 SDOH — ECONOMIC STABILITY: FOOD INSECURITY: WITHIN THE PAST 12 MONTHS, YOU WORRIED THAT YOUR FOOD WOULD RUN OUT BEFORE YOU GOT MONEY TO BUY MORE.: NEVER TRUE

## 2025-05-21 SDOH — ECONOMIC STABILITY: INCOME INSECURITY: IN THE LAST 12 MONTHS, WAS THERE A TIME WHEN YOU WERE NOT ABLE TO PAY THE MORTGAGE OR RENT ON TIME?: NO

## 2025-05-21 SDOH — ECONOMIC STABILITY: TRANSPORTATION INSECURITY
IN THE PAST 12 MONTHS, HAS THE LACK OF TRANSPORTATION KEPT YOU FROM MEDICAL APPOINTMENTS OR FROM GETTING MEDICATIONS?: NO

## 2025-05-21 SDOH — HEALTH STABILITY: PHYSICAL HEALTH: ON AVERAGE, HOW MANY DAYS PER WEEK DO YOU ENGAGE IN MODERATE TO STRENUOUS EXERCISE (LIKE A BRISK WALK)?: 2 DAYS

## 2025-05-21 ASSESSMENT — PATIENT HEALTH QUESTIONNAIRE - PHQ9
2. FEELING DOWN, DEPRESSED OR HOPELESS: NOT AT ALL
SUM OF ALL RESPONSES TO PHQ QUESTIONS 1-9: 0
1. LITTLE INTEREST OR PLEASURE IN DOING THINGS: NOT AT ALL
SUM OF ALL RESPONSES TO PHQ QUESTIONS 1-9: 0

## 2025-05-21 ASSESSMENT — LIFESTYLE VARIABLES
HOW OFTEN DO YOU HAVE A DRINK CONTAINING ALCOHOL: 2
HOW MANY STANDARD DRINKS CONTAINING ALCOHOL DO YOU HAVE ON A TYPICAL DAY: 1 OR 2
HOW OFTEN DO YOU HAVE SIX OR MORE DRINKS ON ONE OCCASION: 1
HOW MANY STANDARD DRINKS CONTAINING ALCOHOL DO YOU HAVE ON A TYPICAL DAY: 1
HOW OFTEN DO YOU HAVE A DRINK CONTAINING ALCOHOL: MONTHLY OR LESS

## 2025-05-21 NOTE — PATIENT INSTRUCTIONS
other health problems such as diabetes, high blood pressure, and high cholesterol. If you think you may have a problem with alcohol or drug use, talk to your doctor.   Medicines    Take your medicines exactly as prescribed. Call your doctor if you think you are having a problem with your medicine.     If your doctor recommends aspirin, take the amount directed each day. Make sure you take aspirin and not another kind of pain reliever, such as acetaminophen (Tylenol).   When should you call for help?   Call 911 if you have symptoms of a heart attack. These may include:    Chest pain or pressure, or a strange feeling in the chest.     Sweating.     Shortness of breath.     Pain, pressure, or a strange feeling in the back, neck, jaw, or upper belly or in one or both shoulders or arms.     Lightheadedness or sudden weakness.     A fast or irregular heartbeat.   After you call 911, the  may tell you to chew 1 adult-strength or 2 to 4 low-dose aspirin. Wait for an ambulance. Do not try to drive yourself.  Watch closely for changes in your health, and be sure to contact your doctor if you have any problems.  Where can you learn more?  Go to https://www.[employer].net/patientEd and enter F075 to learn more about \"A Healthy Heart: Care Instructions.\"  Current as of: July 31, 2024  Content Version: 14.4  © 6389-9042 [employer].   Care instructions adapted under license by [employer]. If you have questions about a medical condition or this instruction, always ask your healthcare professional. [employer], disclaims any warranty or liability for your use of this information.    Personalized Preventive Plan for JW Valdes - 5/21/2025  Medicare offers a range of preventive health benefits. Some of the tests and screenings are paid in full while other may be subject to a deductible, co-insurance, and/or copay.  Some of these benefits include a comprehensive review of your medical history including

## 2025-05-21 NOTE — PROGRESS NOTES
Medicare Annual Wellness Visit    MINH Valdes is here for Medicare AWV and Follow-up (Ringing in both ears)    Assessment & Plan   Medicare annual wellness visit, subsequent  Tinnitus of both ears  -     [facility] - Jose Brown MD, Otolaryngology, [place]  Stage 3b chronic kidney disease (HCC)  Assessment & Plan:   Chronic, not at goal (unstable), lifestyle modifications recommended  Orders:  -     Comprehensive Metabolic Panel; Future  Insomnia, unspecified type  -     traZODone (DESYREL) 50 MG tablet; Take 1 tablet by mouth nightly, Disp-90 tablet, R-1Normal  Allergic rhinitis due to pollen, unspecified seasonality  -     montelukast (SINGULAIR) 10 MG tablet; Take 1 tablet by mouth daily, Disp-90 tablet, R-1Normal  -     fluticasone (FLONASE) 50 MCG/ACT nasal spray; 2 sprays by Each Nostril route daily, Disp-48 g, R-1Normal  Neuropathy  -     gabapentin (NEURONTIN) 300 MG capsule; Take 2 capsules by mouth 2 times daily for 180 days. Intended supply: 30 days, Disp-360 capsule, R-1Normal  Iron deficiency anemia due to chronic blood loss  -     ferrous fumarate-vitamin c (JORDI-SEQUELS) 65-25 MG TBCR CR tablet; Take 1 tablet by mouth daily (with breakfast), Disp-90 tablet, R-1Normal  Mixed hyperlipidemia  -     atorvastatin (LIPITOR) 20 MG tablet; Take 1 tablet by mouth daily, Disp-90 tablet, R-1Normal  -     Lipid Panel; Future  -     CBC with Auto Differential; Future  -     Comprehensive Metabolic Panel; Future  Screening for AAA (abdominal aortic aneurysm)  -     Vascular AAA screening [TUZ851]; Future  Personal history of tobacco use, presenting hazards to health  -     nicotine (NICODERM CQ) 14 MG/24HR; Place 1 patch onto the skin daily, Disp-42 patch, R-0Normal  -     Vascular AAA screening [KUT335]; Future  -     [place] Smoking and Tobacco Use Cessation (Intermediate): 3-10 MINUTES [94433]  ACP (advance care planning)  -     [facility] - Referral to ACP Clinical Specialist  Erectile dysfunction, unspecified erectile

## 2025-05-22 ENCOUNTER — RESULTS FOLLOW-UP (OUTPATIENT)
Dept: FAMILY MEDICINE CLINIC | Facility: CLINIC | Age: 65
End: 2025-05-22

## 2025-05-22 ENCOUNTER — CLINICAL DOCUMENTATION (OUTPATIENT)
Dept: SPIRITUAL SERVICES | Age: 65
End: 2025-05-22

## 2025-05-22 NOTE — PROGRESS NOTES
Advance Care Planning   Ambulatory ACP Specialist Patient Outreach    Date:  5/22/2025    ACP Specialist:  Alyssa Hernandez    Outreach call to patient in follow-up to ACP Specialist referral from:Josefa Preston APRN - CNP    [x] PCP  [] Provider   [] Ambulatory Care Management [] Other     For:                  [x] Advance Directive Assistance              [] Complete Portable DNR order              [] Complete POST/POLST/MOST              [] Code Status Discussion             [] Discuss Goals of Care             [] Early ACP Decision-Making              [] Other (Specify)    Date Referral Received:5/21/25    Next Step:   [x] ACP scheduled conversation  [] Outreach again in one week               [x] Email / Mail ACP Info Sheets  [x] Email / Mail Advance Directive   [] Closing referral.  Routing closure to referring provider/staff and to ACP Specialist .    [] Closure letter mailed to patient with invitation to contact ACP Specialist if / when ready.   [] Other (Specify here):       [] At this time, Healthcare Decision Maker Is:         [] Primary agent named in scanned advance directive.    [] Legal Next of Kin.     [x] Unable to determine legal decision maker at this time.    Outreaches:         [x] 1st -  Date:  5/22/25               Intervention:  [x] Spoke with Patient   [] Left Voice mail [] Email / Mail    [] MyChart  [] Other (Specify) :     Outcomes:  Spoke with patient on mobile phone number which is also listed as the home number scheduling a telephone conversation with ACP Specialist Neno Quiñones on Monday, 6/9/25 at 9:00 a.m.           [] 2nd -  Date:                 Intervention:  [] Spoke with Patient  [] Left Voice mail [] Email / Mail    [] MyChart  [] Other (Specify) :              Outcomes:                [] 3rd -  Date:                Intervention:  [] Spoke with Patient   [] Left Voice mail [] Email / Mail    [] MyChart  [] Other (Specify) :       Outcomes:           []

## 2025-06-09 ENCOUNTER — CLINICAL DOCUMENTATION (OUTPATIENT)
Age: 65
End: 2025-06-09

## 2025-06-16 ENCOUNTER — CLINICAL DOCUMENTATION (OUTPATIENT)
Age: 65
End: 2025-06-16

## 2025-07-01 ENCOUNTER — CLINICAL DOCUMENTATION (OUTPATIENT)
Age: 65
End: 2025-07-01

## 2025-07-13 ENCOUNTER — HOSPITAL ENCOUNTER (EMERGENCY)
Age: 65
Discharge: HOME | End: 2025-07-13
Attending: EMERGENCY MEDICINE
Payer: MEDICARE

## 2025-07-13 ENCOUNTER — APPOINTMENT (OUTPATIENT)
Dept: GENERAL RADIOLOGY | Age: 65
End: 2025-07-13
Payer: MEDICARE

## 2025-07-13 VITALS
HEART RATE: 53 BPM | OXYGEN SATURATION: 97 % | DIASTOLIC BLOOD PRESSURE: 72 MMHG | TEMPERATURE: 98.4 F | HEIGHT: 74 IN | SYSTOLIC BLOOD PRESSURE: 116 MMHG | BODY MASS INDEX: 29.21 KG/M2 | WEIGHT: 227.6 LBS | RESPIRATION RATE: 20 BRPM

## 2025-07-13 DIAGNOSIS — I48.91 ATRIAL FIBRILLATION WITH RAPID VENTRICULAR RESPONSE (HCC): Primary | ICD-10-CM

## 2025-07-13 DIAGNOSIS — R07.9 ACUTE CHEST PAIN: ICD-10-CM

## 2025-07-13 DIAGNOSIS — N18.9 ACUTE RENAL FAILURE SUPERIMPOSED ON CHRONIC KIDNEY DISEASE, UNSPECIFIED ACUTE RENAL FAILURE TYPE, UNSPECIFIED CKD STAGE: ICD-10-CM

## 2025-07-13 DIAGNOSIS — N17.9 ACUTE RENAL FAILURE SUPERIMPOSED ON CHRONIC KIDNEY DISEASE, UNSPECIFIED ACUTE RENAL FAILURE TYPE, UNSPECIFIED CKD STAGE: ICD-10-CM

## 2025-07-13 LAB
ALBUMIN SERPL-MCNC: 3.3 G/DL (ref 3.2–4.6)
ALBUMIN/GLOB SERPL: 1 (ref 1–1.9)
ALP SERPL-CCNC: 72 U/L (ref 40–129)
ALT SERPL-CCNC: 10 U/L (ref 12–65)
ANION GAP SERPL CALC-SCNC: 11 MMOL/L (ref 7–16)
APPEARANCE UR: CLEAR
AST SERPL-CCNC: 30 U/L (ref 15–37)
BACTERIA URNS QL MICRO: 0 /HPF
BASOPHILS # BLD: 0.04 K/UL (ref 0–0.2)
BASOPHILS NFR BLD: 0.7 % (ref 0–2)
BILIRUB SERPL-MCNC: 0.6 MG/DL (ref 0–1.2)
BILIRUB UR QL: ABNORMAL
BUN SERPL-MCNC: 18 MG/DL (ref 8–23)
CALCIUM SERPL-MCNC: 8.3 MG/DL (ref 8.8–10.2)
CHLORIDE SERPL-SCNC: 109 MMOL/L (ref 98–107)
CO2 SERPL-SCNC: 21 MMOL/L (ref 20–29)
COLOR UR: ABNORMAL
CREAT SERPL-MCNC: 1.94 MG/DL (ref 0.8–1.3)
DIFFERENTIAL METHOD BLD: ABNORMAL
EOSINOPHIL # BLD: 0.1 K/UL (ref 0–0.8)
EOSINOPHIL NFR BLD: 1.7 % (ref 0.5–7.8)
EPI CELLS #/AREA URNS HPF: NORMAL /HPF
ERYTHROCYTE [DISTWIDTH] IN BLOOD BY AUTOMATED COUNT: 12.5 % (ref 11.9–14.6)
GLOBULIN SER CALC-MCNC: 3.2 G/DL (ref 2.3–3.5)
GLUCOSE SERPL-MCNC: 108 MG/DL (ref 65–100)
GLUCOSE UR STRIP.AUTO-MCNC: NEGATIVE MG/DL
HCT VFR BLD AUTO: 38.7 % (ref 41.1–50.3)
HGB BLD-MCNC: 12.8 G/DL (ref 13.6–17.2)
HGB UR QL STRIP: ABNORMAL
IMM GRANULOCYTES # BLD AUTO: 0.02 K/UL (ref 0–0.5)
IMM GRANULOCYTES NFR BLD AUTO: 0.3 % (ref 0–5)
KETONES UR QL STRIP.AUTO: 15 MG/DL
LEUKOCYTE ESTERASE UR QL STRIP.AUTO: NEGATIVE
LYMPHOCYTES # BLD: 1.13 K/UL (ref 0.5–4.6)
LYMPHOCYTES NFR BLD: 19.1 % (ref 13–44)
MCH RBC QN AUTO: 32 PG (ref 26.1–32.9)
MCHC RBC AUTO-ENTMCNC: 33.1 G/DL (ref 31.4–35)
MCV RBC AUTO: 96.8 FL (ref 82–102)
MONOCYTES # BLD: 0.57 K/UL (ref 0.1–1.3)
MONOCYTES NFR BLD: 9.6 % (ref 4–12)
MUCOUS THREADS URNS QL MICRO: 0 /LPF
NEUTS SEG # BLD: 4.05 K/UL (ref 1.7–8.2)
NEUTS SEG NFR BLD: 68.6 % (ref 43–78)
NITRITE UR QL STRIP.AUTO: NEGATIVE
NRBC # BLD: 0 K/UL (ref 0–0.2)
OTHER OBSERVATIONS: NORMAL
PH UR STRIP: 5.5 (ref 5–9)
PLATELET # BLD AUTO: 203 K/UL (ref 150–450)
PMV BLD AUTO: 10.4 FL (ref 9.4–12.3)
POTASSIUM SERPL-SCNC: 3.8 MMOL/L (ref 3.5–5.1)
PROT SERPL-MCNC: 6.5 G/DL (ref 6.3–8.2)
PROT UR STRIP-MCNC: 100 MG/DL
RBC # BLD AUTO: 4 M/UL (ref 4.23–5.6)
RBC #/AREA URNS HPF: NORMAL /HPF
SODIUM SERPL-SCNC: 141 MMOL/L (ref 133–143)
SP GR UR REFRACTOMETRY: 1.02 (ref 1–1.02)
TROPONIN T SERPL HS-MCNC: 17.7 NG/L (ref 0–22)
TROPONIN T SERPL HS-MCNC: 19.2 NG/L (ref 0–22)
UROBILINOGEN UR QL STRIP.AUTO: 0.2 EU/DL (ref 0.2–1)
WBC # BLD AUTO: 5.9 K/UL (ref 4.3–11.1)
WBC URNS QL MICRO: NORMAL /HPF

## 2025-07-13 PROCEDURE — 85025 COMPLETE CBC W/AUTO DIFF WBC: CPT

## 2025-07-13 PROCEDURE — 2580000003 HC RX 258: Performed by: EMERGENCY MEDICINE

## 2025-07-13 PROCEDURE — 81001 URINALYSIS AUTO W/SCOPE: CPT

## 2025-07-13 PROCEDURE — 71045 X-RAY EXAM CHEST 1 VIEW: CPT

## 2025-07-13 PROCEDURE — 80053 COMPREHEN METABOLIC PANEL: CPT

## 2025-07-13 PROCEDURE — 84484 ASSAY OF TROPONIN QUANT: CPT

## 2025-07-13 PROCEDURE — 99285 EMERGENCY DEPT VISIT HI MDM: CPT

## 2025-07-13 RX ORDER — 0.9 % SODIUM CHLORIDE 0.9 %
1000 INTRAVENOUS SOLUTION INTRAVENOUS
Status: COMPLETED | OUTPATIENT
Start: 2025-07-13 | End: 2025-07-13

## 2025-07-13 RX ADMIN — SODIUM CHLORIDE 1000 ML: 0.9 INJECTION, SOLUTION INTRAVENOUS at 13:21

## 2025-07-13 ASSESSMENT — PAIN DESCRIPTION - DESCRIPTORS: DESCRIPTORS: PRESSURE

## 2025-07-13 ASSESSMENT — PAIN - FUNCTIONAL ASSESSMENT: PAIN_FUNCTIONAL_ASSESSMENT: 0-10

## 2025-07-13 ASSESSMENT — PAIN SCALES - GENERAL: PAINLEVEL_OUTOF10: 6

## 2025-07-13 ASSESSMENT — PAIN DESCRIPTION - LOCATION: LOCATION: CHEST

## 2025-07-13 NOTE — ED TRIAGE NOTES
Pt to room via wheelchair. Pt reports intermittent dizziness and irregular heart rate that started this morning at 1000. Pt reports chest pressure, denies shortness of breath.

## 2025-07-13 NOTE — ED PROVIDER NOTES
Emergency Department Provider Note       [facility] EMERGENCY DEPT   PCP: Josefa Preston, GORAN Lopez CNP   Age: 65 y.o.   Sex: male     DISPOSITION Decision To Discharge 07/13/2025 03:54:59 PM    ICD-10-CM    1. Atrial fibrillation with rapid ventricular response (HCC)  I48.91       2. Acute chest pain  R07.9       3. Acute renal failure superimposed on chronic kidney disease, unspecified acute renal failure type, unspecified CKD stage  N17.9     N18.9           Medical Decision Making     65-year-old AA male with history of A-fib on Eliquis, HTN, mixed hyperlipidemia, and arthritis presents to the emergency department complaining of sudden onset of lightheadedness with mild chest tightness while singing in the choir this morning.  Patient states onset was approximately an hour PTA.  Similar symptoms in the past with A-fib.  Denies any recent change in medications, fever, chills, URI or UTI symptoms.  He has described a slightly decreased appetite recently and states 2 days ago he did not need anything.  On exam, the patient was initially tachycardic with an irregularly irregular rhythm, was given a fluid bolus for mild hypotension, and screen with a CBC which showed a normal white count, hemoglobin 12.8, and hematocrit of 38.7.  Troponin was 17.7, chest x-ray negative for acute process, and electrolytes are pending.  Patient was noted returned within sinus rhythm soon after receiving fluid bolus and feeling much improved with no further chest discomfort.  CMP reveals a chloride of 109, glucose 108 and creatinine 1.94, which is slightly higher than patient's baseline which appears to be in the 1.5 to 1.8 range.  He was therefore given fluid bolus, and troponins x 2 were unremarkable.  UA revealed no evidence of infection.  Patient instructed follow-up with his cardiologist this week, and return for worsening symptoms.    1 or more chronic illnesses with a severe exacerbation or progression.  Chronic medical problems

## 2025-07-13 NOTE — DISCHARGE INSTRUCTIONS
Recommend recheck toward the end of the week with family doctor to make sure kidney function is improving.  Drink plenty of water.  Return for increasing chest pain or weakness.

## 2025-07-14 ENCOUNTER — OFFICE VISIT (OUTPATIENT)
Age: 65
End: 2025-07-14
Payer: MEDICARE

## 2025-07-14 ENCOUNTER — CARE COORDINATION (OUTPATIENT)
Dept: CARE COORDINATION | Facility: CLINIC | Age: 65
End: 2025-07-14

## 2025-07-14 VITALS
HEIGHT: 74 IN | WEIGHT: 224 LBS | DIASTOLIC BLOOD PRESSURE: 80 MMHG | HEART RATE: 72 BPM | SYSTOLIC BLOOD PRESSURE: 126 MMHG | BODY MASS INDEX: 28.75 KG/M2

## 2025-07-14 DIAGNOSIS — I48.19 PERSISTENT ATRIAL FIBRILLATION (HCC): Primary | ICD-10-CM

## 2025-07-14 PROCEDURE — 99214 OFFICE O/P EST MOD 30 MIN: CPT | Performed by: PHYSICIAN ASSISTANT

## 2025-07-14 PROCEDURE — G8427 DOCREV CUR MEDS BY ELIG CLIN: HCPCS | Performed by: PHYSICIAN ASSISTANT

## 2025-07-14 PROCEDURE — 4004F PT TOBACCO SCREEN RCVD TLK: CPT | Performed by: PHYSICIAN ASSISTANT

## 2025-07-14 PROCEDURE — 3079F DIAST BP 80-89 MM HG: CPT | Performed by: PHYSICIAN ASSISTANT

## 2025-07-14 PROCEDURE — 1123F ACP DISCUSS/DSCN MKR DOCD: CPT | Performed by: PHYSICIAN ASSISTANT

## 2025-07-14 PROCEDURE — 1126F AMNT PAIN NOTED NONE PRSNT: CPT | Performed by: PHYSICIAN ASSISTANT

## 2025-07-14 PROCEDURE — G8419 CALC BMI OUT NRM PARAM NOF/U: HCPCS | Performed by: PHYSICIAN ASSISTANT

## 2025-07-14 PROCEDURE — 3017F COLORECTAL CA SCREEN DOC REV: CPT | Performed by: PHYSICIAN ASSISTANT

## 2025-07-14 PROCEDURE — 93000 ELECTROCARDIOGRAM COMPLETE: CPT | Performed by: PHYSICIAN ASSISTANT

## 2025-07-14 PROCEDURE — 1160F RVW MEDS BY RX/DR IN RCRD: CPT | Performed by: PHYSICIAN ASSISTANT

## 2025-07-14 PROCEDURE — 1159F MED LIST DOCD IN RCRD: CPT | Performed by: PHYSICIAN ASSISTANT

## 2025-07-14 PROCEDURE — 3074F SYST BP LT 130 MM HG: CPT | Performed by: PHYSICIAN ASSISTANT

## 2025-07-14 NOTE — CARE COORDINATION
Ambulatory Care Coordination Note     7/14/2025 10:51 AM     ACM outreach attempt by this ACM today to offer care management services. ACM was unable to reach the patient by telephone today;   hospital follow up call within 2 business days of discharge: Yes     ACM: Olga Campo RN     Care Summary Note: left message    PCP/Specialist follow up:       Follow Up:   Plan for next ACM outreach in approximately 1-2 days  to complete:  - outreach attempt to offer care management services.

## 2025-07-14 NOTE — PROGRESS NOTES
more detailed discussion of possible complications from cardiac ablation will be addressed again during the consent process the day of the procedure.  The patient will also meet with the staff anesthesiologist the day of the procedure to discuss some of the same possible risks, as well as other risks, specific to the patient undergoing anesthesia.  Pt will undergo a transesophageal echocardiogram immediately prior to catheter insertion to completely exclude the possibility of left atrial appendage thrombus. We will follow up with patient post hospitalization.       --EMILY followed by afib ablation by Dr Cleaning        --hold Xarelto the day prior    2. CVA protection   - CHADSVasc = 2 (Age, HTN)  - continue Xarelto 20 mg daily , no bleeding issues      3. Tachycardia induced Cardiomyopathy   - now with normalized EF   - continue GDMT as tolerated- valsartan 80, Entresto 49-51 mg, carvedilol 25 mg     4. Amiodarone: currently on 100 mg daily, wishes to to get off, will continue until after the ablation        Patient has been instructed and agrees to call our office with any issues or other concerns related to their cardiac condition(s) and/or complaint(s).      Josefa Painter PA-C

## 2025-07-15 ENCOUNTER — CARE COORDINATION (OUTPATIENT)
Dept: CARE COORDINATION | Facility: CLINIC | Age: 65
End: 2025-07-15

## 2025-07-15 NOTE — CARE COORDINATION
Ambulatory Care Coordination Note     7/15/2025 8:58 AM     ACM outreach attempt by this ACM today to perform care management follow up . ACM was unable to reach the patient by telephone today;   left voice message requesting a return phone call to this ACM.     ACM: Olga Campo RN         PCP/Specialist follow up:       Follow Up:   Plan for next ACM outreach in approximately 1 week to complete:  - outreach attempt to offer care management services.

## 2025-07-21 ENCOUNTER — OFFICE VISIT (OUTPATIENT)
Dept: FAMILY MEDICINE CLINIC | Facility: CLINIC | Age: 65
End: 2025-07-21
Payer: MEDICARE

## 2025-07-21 ENCOUNTER — CARE COORDINATION (OUTPATIENT)
Dept: CARE COORDINATION | Facility: CLINIC | Age: 65
End: 2025-07-21

## 2025-07-21 VITALS
DIASTOLIC BLOOD PRESSURE: 70 MMHG | OXYGEN SATURATION: 97 % | HEART RATE: 58 BPM | HEIGHT: 74 IN | SYSTOLIC BLOOD PRESSURE: 120 MMHG | WEIGHT: 231 LBS | RESPIRATION RATE: 18 BRPM | TEMPERATURE: 97.5 F | BODY MASS INDEX: 29.65 KG/M2

## 2025-07-21 DIAGNOSIS — I48.0 PAF (PAROXYSMAL ATRIAL FIBRILLATION) (HCC): ICD-10-CM

## 2025-07-21 DIAGNOSIS — N18.32 ANEMIA DUE TO STAGE 3B CHRONIC KIDNEY DISEASE (HCC): Primary | ICD-10-CM

## 2025-07-21 DIAGNOSIS — D63.1 ANEMIA DUE TO STAGE 3B CHRONIC KIDNEY DISEASE (HCC): Primary | ICD-10-CM

## 2025-07-21 PROCEDURE — 1159F MED LIST DOCD IN RCRD: CPT | Performed by: NURSE PRACTITIONER

## 2025-07-21 PROCEDURE — 3078F DIAST BP <80 MM HG: CPT | Performed by: NURSE PRACTITIONER

## 2025-07-21 PROCEDURE — 1160F RVW MEDS BY RX/DR IN RCRD: CPT | Performed by: NURSE PRACTITIONER

## 2025-07-21 PROCEDURE — G8427 DOCREV CUR MEDS BY ELIG CLIN: HCPCS | Performed by: NURSE PRACTITIONER

## 2025-07-21 PROCEDURE — 3017F COLORECTAL CA SCREEN DOC REV: CPT | Performed by: NURSE PRACTITIONER

## 2025-07-21 PROCEDURE — 1123F ACP DISCUSS/DSCN MKR DOCD: CPT | Performed by: NURSE PRACTITIONER

## 2025-07-21 PROCEDURE — G2211 COMPLEX E/M VISIT ADD ON: HCPCS | Performed by: NURSE PRACTITIONER

## 2025-07-21 PROCEDURE — 99214 OFFICE O/P EST MOD 30 MIN: CPT | Performed by: NURSE PRACTITIONER

## 2025-07-21 PROCEDURE — 4004F PT TOBACCO SCREEN RCVD TLK: CPT | Performed by: NURSE PRACTITIONER

## 2025-07-21 PROCEDURE — 3074F SYST BP LT 130 MM HG: CPT | Performed by: NURSE PRACTITIONER

## 2025-07-21 PROCEDURE — G8419 CALC BMI OUT NRM PARAM NOF/U: HCPCS | Performed by: NURSE PRACTITIONER

## 2025-07-21 RX ORDER — LORATADINE 10 MG/1
10 TABLET ORAL DAILY
COMMUNITY

## 2025-07-21 ASSESSMENT — ENCOUNTER SYMPTOMS
RESPIRATORY NEGATIVE: 1
SHORTNESS OF BREATH: 0

## 2025-07-21 ASSESSMENT — PATIENT HEALTH QUESTIONNAIRE - PHQ9
SUM OF ALL RESPONSES TO PHQ QUESTIONS 1-9: 0
SUM OF ALL RESPONSES TO PHQ QUESTIONS 1-9: 0
1. LITTLE INTEREST OR PLEASURE IN DOING THINGS: NOT AT ALL
SUM OF ALL RESPONSES TO PHQ QUESTIONS 1-9: 0
SUM OF ALL RESPONSES TO PHQ QUESTIONS 1-9: 0
2. FEELING DOWN, DEPRESSED OR HOPELESS: NOT AT ALL

## 2025-07-21 NOTE — PROGRESS NOTES
MINH Valdes (:  1960) is a 65 y.o. male,Established patient, here for evaluation of the following chief complaint(s):  Follow-up (ED follow up/Wants to know, if he can take tylenol)         Assessment & Plan  Anemia due to stage 3b chronic kidney disease (HCC)       Orders:    CBC with Auto Differential; Future    Comprehensive Metabolic Panel; Future  see me in 3 months to repeat blood work and make sure numbers trending in correct direction.  PAF (paroxysmal atrial fibrillation) (HCC)              Assessment & Plan  1. Atrial Fibrillation.  - Diagnosed with atrial fibrillation with rapid ventricular response on 2025. History of atrial fibrillation and currently on Eliquis.  - Given a fluid bolus for mild hypotension and returned to sinus rhythm soon after.  - Saw cardiology on 2025 and discussed rate and rhythm control strategies, including DCCV, antiarrhythmic therapy, catheter ablation, and combination therapy.  - Scheduled for a EMILY followed by an A-fib ablation with Dr. Mitchell. Will hold Xarelto the day before the procedure and continue it afterward. Amiodarone will be continued until after the ablation.    2. Chest Pain.  - Experienced mild chest pain during the episode on 2025.  - Two sets of troponins were negative, and a chest x-ray showed no acute process.  - Given a fluid bolus and felt better with no more chest discomfort.  - Advised to follow up with cardiology and return if symptoms worsen.    3. Renal Failure.  - Acute on chronic renal failure during the ER visit on 2025. Creatinine level was 1.94, slightly higher than baseline of 1.5-1.8.  - Kidney function will be monitored, and if there is any decline, a referral to nephrology will be considered.  - CMP showed chloride of 109, glucose 108, creatinine 1.94.    4. Hypertension.  - Currently on losartan 80 mg and Entresto for hypertension.  - Blood pressure was low during the ER visit due to dehydration but has

## 2025-07-21 NOTE — CARE COORDINATION
Ambulatory Care Coordination Note     2025 10:07 AM     Patient Current Location:  [place]     This patient was received as a referral from Ambulatory Care Manager .    ACM contacted the patient by telephone. Verified name and  with patient as identifiers. Provided introduction to self, and explanation of the ACM role.   Patient accepted care management services at this time.          ACM: Olga Campo RN     Challenges to be reviewed by the provider   Additional needs identified to be addressed with provider Yes  Left message with pcp office to notify of er admission and need for follow up               Method of communication with provider: staff message.    Utilization: Initial Call - Discharge Date: 25   Discharge Facility: [facility]  Reason for ED Visit: DORINA  Visit Diagnosis: A-fib    Number of ED visits in the last 6 months: 1      Do you have any ongoing symptoms? No  Did you call your PCP prior to going to the ED? No, did not call the PCP office.     Review of Discharge Instructions:   [] AVS discharge instructions  [] Right Care, Right Place, Right Time document  [x] Medication changes  [x] Follow up appointments  [] Referral follow up   []        Care Summary Note: see assessment. Ccm outreached to patient. Patient agreeable to ccm services. Reviewed with patient discharge summary. Patient verbalized understanding. Patient states no chest pain or rapid HR at this time. Ccm left message with pcp office to notify of er admission and need for follow up. Patient states he has been eating and drinking well. Patient monitors BP and pulse at home. Patient states no questions or concerns. Ccm discussed that I would outreach next week. Patient agreeable.     Offered patient enrollment in the Remote Patient Monitoring (RPM) program for in-home monitoring: Yes, but did not enroll at this time: declined.     Assessments Completed:       2025    10:03 AM   Amb

## 2025-07-23 ENCOUNTER — TELEPHONE (OUTPATIENT)
Age: 65
End: 2025-07-23

## 2025-07-23 DIAGNOSIS — I48.0 PAROXYSMAL ATRIAL FIBRILLATION (HCC): Primary | ICD-10-CM

## 2025-07-23 PROBLEM — I48.91 AF (ATRIAL FIBRILLATION) (HCC): Status: ACTIVE | Noted: 2025-07-23

## 2025-07-23 NOTE — TELEPHONE ENCOUNTER
Orders placed per verbal from Dr. Cleaning.     ZAKIA/PFA/DARYL    Reviewed pre-procedure instructions over the phone. Understanding verbalized.     No questions at this time.     Pre-procedure packet mailed to patient's verified home address.

## 2025-08-04 ENCOUNTER — CARE COORDINATION (OUTPATIENT)
Dept: CARE COORDINATION | Facility: CLINIC | Age: 65
End: 2025-08-04

## 2025-08-11 ENCOUNTER — CARE COORDINATION (OUTPATIENT)
Dept: CARE COORDINATION | Facility: CLINIC | Age: 65
End: 2025-08-11

## 2025-08-14 DIAGNOSIS — I48.0 PAROXYSMAL ATRIAL FIBRILLATION (HCC): ICD-10-CM

## 2025-08-14 LAB
ANION GAP SERPL CALC-SCNC: 11 MMOL/L (ref 7–16)
BASOPHILS # BLD: 0.06 K/UL (ref 0–0.2)
BASOPHILS NFR BLD: 1.1 % (ref 0–2)
BUN SERPL-MCNC: 19 MG/DL (ref 8–23)
CALCIUM SERPL-MCNC: 8.4 MG/DL (ref 8.8–10.2)
CHLORIDE SERPL-SCNC: 107 MMOL/L (ref 98–107)
CO2 SERPL-SCNC: 25 MMOL/L (ref 20–29)
CREAT SERPL-MCNC: 1.55 MG/DL (ref 0.8–1.3)
DIFFERENTIAL METHOD BLD: ABNORMAL
EOSINOPHIL # BLD: 0.18 K/UL (ref 0–0.8)
EOSINOPHIL NFR BLD: 3.4 % (ref 0.5–7.8)
ERYTHROCYTE [DISTWIDTH] IN BLOOD BY AUTOMATED COUNT: 13.2 % (ref 11.9–14.6)
GLUCOSE SERPL-MCNC: 107 MG/DL (ref 70–99)
HCT VFR BLD AUTO: 33.7 % (ref 41.1–50.3)
HGB BLD-MCNC: 10.8 G/DL (ref 13.6–17.2)
IMM GRANULOCYTES # BLD AUTO: 0.02 K/UL (ref 0–0.5)
IMM GRANULOCYTES NFR BLD AUTO: 0.4 % (ref 0–5)
LYMPHOCYTES # BLD: 1.75 K/UL (ref 0.5–4.6)
LYMPHOCYTES NFR BLD: 33.2 % (ref 13–44)
MAGNESIUM SERPL-MCNC: 1.6 MG/DL (ref 1.8–2.4)
MCH RBC QN AUTO: 32.2 PG (ref 26.1–32.9)
MCHC RBC AUTO-ENTMCNC: 32 G/DL (ref 31.4–35)
MCV RBC AUTO: 100.6 FL (ref 82–102)
MONOCYTES # BLD: 0.64 K/UL (ref 0.1–1.3)
MONOCYTES NFR BLD: 12.1 % (ref 4–12)
NEUTS SEG # BLD: 2.62 K/UL (ref 1.7–8.2)
NEUTS SEG NFR BLD: 49.8 % (ref 43–78)
NRBC # BLD: 0 K/UL (ref 0–0.2)
PLATELET # BLD AUTO: 196 K/UL (ref 150–450)
PMV BLD AUTO: 11.2 FL (ref 9.4–12.3)
POTASSIUM SERPL-SCNC: 4 MMOL/L (ref 3.5–5.1)
RBC # BLD AUTO: 3.35 M/UL (ref 4.23–5.6)
SODIUM SERPL-SCNC: 143 MMOL/L (ref 136–145)
WBC # BLD AUTO: 5.3 K/UL (ref 4.3–11.1)

## 2025-08-18 ENCOUNTER — CARE COORDINATION (OUTPATIENT)
Dept: CARE COORDINATION | Facility: CLINIC | Age: 65
End: 2025-08-18

## 2025-08-27 ENCOUNTER — ANESTHESIA (OUTPATIENT)
Dept: CARDIAC CATH/INVASIVE PROCEDURES | Age: 65
End: 2025-08-27
Payer: MEDICARE

## 2025-08-27 ENCOUNTER — ANESTHESIA EVENT (OUTPATIENT)
Dept: CARDIAC CATH/INVASIVE PROCEDURES | Age: 65
End: 2025-08-27
Payer: MEDICARE

## 2025-08-27 ENCOUNTER — APPOINTMENT (OUTPATIENT)
Dept: CARDIAC CATH/INVASIVE PROCEDURES | Age: 65
End: 2025-08-27
Attending: INTERNAL MEDICINE
Payer: MEDICARE

## 2025-08-27 ENCOUNTER — HOSPITAL ENCOUNTER (OUTPATIENT)
Age: 65
Setting detail: OUTPATIENT SURGERY
Discharge: HOME | End: 2025-08-27
Attending: INTERNAL MEDICINE | Admitting: INTERNAL MEDICINE
Payer: MEDICARE

## 2025-08-27 VITALS
HEART RATE: 48 BPM | RESPIRATION RATE: 18 BRPM | WEIGHT: 228 LBS | HEIGHT: 74 IN | SYSTOLIC BLOOD PRESSURE: 135 MMHG | TEMPERATURE: 97.8 F | OXYGEN SATURATION: 95 % | DIASTOLIC BLOOD PRESSURE: 73 MMHG | BODY MASS INDEX: 29.26 KG/M2

## 2025-08-27 DIAGNOSIS — I48.91 AF (ATRIAL FIBRILLATION) (HCC): ICD-10-CM

## 2025-08-27 DIAGNOSIS — I48.0 PAROXYSMAL ATRIAL FIBRILLATION (HCC): Primary | ICD-10-CM

## 2025-08-27 LAB
ACT BLD: 417 SECS (ref 70–128)
ACT BLD: 475 SECS (ref 70–128)
ANION GAP SERPL CALC-SCNC: 11 MMOL/L (ref 7–16)
BUN SERPL-MCNC: 17 MG/DL (ref 8–23)
CALCIUM SERPL-MCNC: 8.5 MG/DL (ref 8.8–10.2)
CHLORIDE SERPL-SCNC: 108 MMOL/L (ref 98–107)
CO2 SERPL-SCNC: 23 MMOL/L (ref 20–29)
CREAT SERPL-MCNC: 2.09 MG/DL (ref 0.8–1.3)
ECHO BSA: 2.32 M2
ECHO BSA: 2.32 M2
EKG ATRIAL RATE: 52 BPM
EKG DIAGNOSIS: NORMAL
EKG P AXIS: 18 DEGREES
EKG P-R INTERVAL: 198 MS
EKG Q-T INTERVAL: 466 MS
EKG QRS DURATION: 90 MS
EKG QTC CALCULATION (BAZETT): 433 MS
EKG R AXIS: 7 DEGREES
EKG T AXIS: -74 DEGREES
EKG VENTRICULAR RATE: 52 BPM
ERYTHROCYTE [DISTWIDTH] IN BLOOD BY AUTOMATED COUNT: 13.2 % (ref 11.9–14.6)
GLUCOSE SERPL-MCNC: 103 MG/DL (ref 70–99)
HCT VFR BLD AUTO: 34.3 % (ref 41.1–50.3)
HGB BLD-MCNC: 11 G/DL (ref 13.6–17.2)
MAGNESIUM SERPL-MCNC: 1.6 MG/DL (ref 1.8–2.4)
MCH RBC QN AUTO: 31.9 PG (ref 26.1–32.9)
MCHC RBC AUTO-ENTMCNC: 32.1 G/DL (ref 31.4–35)
MCV RBC AUTO: 99.4 FL (ref 82–102)
NRBC # BLD: 0 K/UL (ref 0–0.2)
PLATELET # BLD AUTO: 211 K/UL (ref 150–450)
PMV BLD AUTO: 10.4 FL (ref 9.4–12.3)
POTASSIUM SERPL-SCNC: 3.6 MMOL/L (ref 3.5–5.1)
RBC # BLD AUTO: 3.45 M/UL (ref 4.23–5.6)
SODIUM SERPL-SCNC: 142 MMOL/L (ref 136–145)
WBC # BLD AUTO: 6.6 K/UL (ref 4.3–11.1)

## 2025-08-27 PROCEDURE — 85347 COAGULATION TIME ACTIVATED: CPT

## 2025-08-27 PROCEDURE — 93325 DOPPLER ECHO COLOR FLOW MAPG: CPT

## 2025-08-27 PROCEDURE — 93622 COMP EP EVAL L VENTR PAC&REC: CPT | Performed by: INTERNAL MEDICINE

## 2025-08-27 PROCEDURE — 7100000000 HC PACU RECOVERY - FIRST 15 MIN: Performed by: INTERNAL MEDICINE

## 2025-08-27 PROCEDURE — 85027 COMPLETE CBC AUTOMATED: CPT

## 2025-08-27 PROCEDURE — C1732 CATH, EP, DIAG/ABL, 3D/VECT: HCPCS | Performed by: INTERNAL MEDICINE

## 2025-08-27 PROCEDURE — 83735 ASSAY OF MAGNESIUM: CPT

## 2025-08-27 PROCEDURE — 3700000001 HC ADD 15 MINUTES (ANESTHESIA): Performed by: INTERNAL MEDICINE

## 2025-08-27 PROCEDURE — C1894 INTRO/SHEATH, NON-LASER: HCPCS | Performed by: INTERNAL MEDICINE

## 2025-08-27 PROCEDURE — 93657 TX L/R ATRIAL FIB ADDL: CPT | Performed by: INTERNAL MEDICINE

## 2025-08-27 PROCEDURE — C1759 CATH, INTRA ECHOCARDIOGRAPHY: HCPCS | Performed by: INTERNAL MEDICINE

## 2025-08-27 PROCEDURE — 93005 ELECTROCARDIOGRAM TRACING: CPT | Performed by: INTERNAL MEDICINE

## 2025-08-27 PROCEDURE — 2720000010 HC SURG SUPPLY STERILE: Performed by: INTERNAL MEDICINE

## 2025-08-27 PROCEDURE — 93655 ICAR CATH ABLTJ DSCRT ARRHYT: CPT | Performed by: INTERNAL MEDICINE

## 2025-08-27 PROCEDURE — C1766 INTRO/SHEATH,STRBLE,NON-PEEL: HCPCS | Performed by: INTERNAL MEDICINE

## 2025-08-27 PROCEDURE — 6360000002 HC RX W HCPCS: Performed by: INTERNAL MEDICINE

## 2025-08-27 PROCEDURE — 93010 ELECTROCARDIOGRAM REPORT: CPT | Performed by: INTERNAL MEDICINE

## 2025-08-27 PROCEDURE — C1760 CLOSURE DEV, VASC: HCPCS | Performed by: INTERNAL MEDICINE

## 2025-08-27 PROCEDURE — 80048 BASIC METABOLIC PNL TOTAL CA: CPT

## 2025-08-27 PROCEDURE — 2709999900 HC NON-CHARGEABLE SUPPLY: Performed by: INTERNAL MEDICINE

## 2025-08-27 PROCEDURE — C1733 CATH, EP, OTHR THAN COOL-TIP: HCPCS | Performed by: INTERNAL MEDICINE

## 2025-08-27 PROCEDURE — 7100000001 HC PACU RECOVERY - ADDTL 15 MIN: Performed by: INTERNAL MEDICINE

## 2025-08-27 PROCEDURE — 3700000000 HC ANESTHESIA ATTENDED CARE: Performed by: INTERNAL MEDICINE

## 2025-08-27 PROCEDURE — C1730 CATH, EP, 19 OR FEW ELECT: HCPCS | Performed by: INTERNAL MEDICINE

## 2025-08-27 PROCEDURE — C1893 INTRO/SHEATH, FIXED,NON-PEEL: HCPCS | Performed by: INTERNAL MEDICINE

## 2025-08-27 PROCEDURE — 93656 COMPRE EP EVAL ABLTJ ATR FIB: CPT | Performed by: INTERNAL MEDICINE

## 2025-08-27 RX ORDER — HALOPERIDOL 5 MG/ML
1 INJECTION INTRAMUSCULAR
Status: DISCONTINUED | OUTPATIENT
Start: 2025-08-27 | End: 2025-08-27 | Stop reason: HOSPADM

## 2025-08-27 RX ORDER — IPRATROPIUM BROMIDE AND ALBUTEROL SULFATE 2.5; .5 MG/3ML; MG/3ML
1 SOLUTION RESPIRATORY (INHALATION)
Status: CANCELLED | OUTPATIENT
Start: 2025-08-27

## 2025-08-27 RX ORDER — LIDOCAINE HYDROCHLORIDE 20 MG/ML
INJECTION, SOLUTION EPIDURAL; INFILTRATION; INTRACAUDAL; PERINEURAL
Status: DISCONTINUED | OUTPATIENT
Start: 2025-08-27 | End: 2025-08-27 | Stop reason: SDUPTHER

## 2025-08-27 RX ORDER — ONDANSETRON 2 MG/ML
INJECTION INTRAMUSCULAR; INTRAVENOUS
Status: DISCONTINUED | OUTPATIENT
Start: 2025-08-27 | End: 2025-08-27 | Stop reason: SDUPTHER

## 2025-08-27 RX ORDER — PHENYLEPHRINE HYDROCHLORIDE 10 MG/ML
INJECTION INTRAVENOUS
Status: DISCONTINUED | OUTPATIENT
Start: 2025-08-27 | End: 2025-08-27 | Stop reason: SDUPTHER

## 2025-08-27 RX ORDER — HEPARIN SODIUM 10000 [USP'U]/100ML
INJECTION, SOLUTION INTRAVENOUS
Status: DISCONTINUED | OUTPATIENT
Start: 2025-08-27 | End: 2025-08-27 | Stop reason: SDUPTHER

## 2025-08-27 RX ORDER — ACETAMINOPHEN 500 MG
1000 TABLET ORAL ONCE
Status: CANCELLED | OUTPATIENT
Start: 2025-08-27 | End: 2025-08-27

## 2025-08-27 RX ORDER — PROTAMINE SULFATE 10 MG/ML
INJECTION, SOLUTION INTRAVENOUS
Status: DISCONTINUED | OUTPATIENT
Start: 2025-08-27 | End: 2025-08-27 | Stop reason: SDUPTHER

## 2025-08-27 RX ORDER — PROPOFOL 10 MG/ML
INJECTION, EMULSION INTRAVENOUS
Status: DISCONTINUED | OUTPATIENT
Start: 2025-08-27 | End: 2025-08-27 | Stop reason: SDUPTHER

## 2025-08-27 RX ORDER — LIDOCAINE HYDROCHLORIDE AND EPINEPHRINE 10; 10 MG/ML; UG/ML
INJECTION, SOLUTION INFILTRATION; PERINEURAL PRN
Status: DISCONTINUED | OUTPATIENT
Start: 2025-08-27 | End: 2025-08-27 | Stop reason: HOSPADM

## 2025-08-27 RX ORDER — EPHEDRINE SULFATE 5 MG/ML
INJECTION INTRAVENOUS
Status: DISCONTINUED | OUTPATIENT
Start: 2025-08-27 | End: 2025-08-27 | Stop reason: SDUPTHER

## 2025-08-27 RX ORDER — FENTANYL CITRATE 50 UG/ML
100 INJECTION, SOLUTION INTRAMUSCULAR; INTRAVENOUS
Refills: 0 | Status: CANCELLED | OUTPATIENT
Start: 2025-08-27

## 2025-08-27 RX ORDER — FENTANYL CITRATE 50 UG/ML
50 INJECTION, SOLUTION INTRAMUSCULAR; INTRAVENOUS EVERY 5 MIN PRN
Status: DISCONTINUED | OUTPATIENT
Start: 2025-08-27 | End: 2025-08-27 | Stop reason: HOSPADM

## 2025-08-27 RX ORDER — MAGNESIUM SULFATE IN WATER 40 MG/ML
2000 INJECTION, SOLUTION INTRAVENOUS ONCE
Status: COMPLETED | OUTPATIENT
Start: 2025-08-27 | End: 2025-08-27

## 2025-08-27 RX ORDER — ROCURONIUM BROMIDE 10 MG/ML
INJECTION, SOLUTION INTRAVENOUS
Status: DISCONTINUED | OUTPATIENT
Start: 2025-08-27 | End: 2025-08-27 | Stop reason: SDUPTHER

## 2025-08-27 RX ORDER — SODIUM CHLORIDE 0.9 % (FLUSH) 0.9 %
5-40 SYRINGE (ML) INJECTION PRN
Status: CANCELLED | OUTPATIENT
Start: 2025-08-27

## 2025-08-27 RX ORDER — OXYCODONE HYDROCHLORIDE 5 MG/1
5 TABLET ORAL
Status: DISCONTINUED | OUTPATIENT
Start: 2025-08-27 | End: 2025-08-27 | Stop reason: HOSPADM

## 2025-08-27 RX ORDER — SODIUM CHLORIDE 0.9 % (FLUSH) 0.9 %
5-40 SYRINGE (ML) INJECTION EVERY 12 HOURS SCHEDULED
Status: DISCONTINUED | OUTPATIENT
Start: 2025-08-27 | End: 2025-08-27 | Stop reason: HOSPADM

## 2025-08-27 RX ORDER — SODIUM CHLORIDE 0.9 % (FLUSH) 0.9 %
5-40 SYRINGE (ML) INJECTION PRN
Status: DISCONTINUED | OUTPATIENT
Start: 2025-08-27 | End: 2025-08-27 | Stop reason: HOSPADM

## 2025-08-27 RX ORDER — IPRATROPIUM BROMIDE AND ALBUTEROL SULFATE 2.5; .5 MG/3ML; MG/3ML
1 SOLUTION RESPIRATORY (INHALATION)
Status: DISCONTINUED | OUTPATIENT
Start: 2025-08-27 | End: 2025-08-27 | Stop reason: HOSPADM

## 2025-08-27 RX ORDER — MIDAZOLAM HYDROCHLORIDE 2 MG/2ML
2 INJECTION, SOLUTION INTRAMUSCULAR; INTRAVENOUS
Status: CANCELLED | OUTPATIENT
Start: 2025-08-27

## 2025-08-27 RX ORDER — SODIUM CHLORIDE, SODIUM LACTATE, POTASSIUM CHLORIDE, CALCIUM CHLORIDE 600; 310; 30; 20 MG/100ML; MG/100ML; MG/100ML; MG/100ML
INJECTION, SOLUTION INTRAVENOUS
Status: DISCONTINUED | OUTPATIENT
Start: 2025-08-27 | End: 2025-08-27 | Stop reason: SDUPTHER

## 2025-08-27 RX ORDER — LIDOCAINE HYDROCHLORIDE 10 MG/ML
1 INJECTION, SOLUTION INFILTRATION; PERINEURAL
Status: CANCELLED | OUTPATIENT
Start: 2025-08-27

## 2025-08-27 RX ORDER — SODIUM CHLORIDE 0.9 % (FLUSH) 0.9 %
5-40 SYRINGE (ML) INJECTION EVERY 12 HOURS SCHEDULED
Status: CANCELLED | OUTPATIENT
Start: 2025-08-27

## 2025-08-27 RX ORDER — ONDANSETRON 2 MG/ML
4 INJECTION INTRAMUSCULAR; INTRAVENOUS
Status: DISCONTINUED | OUTPATIENT
Start: 2025-08-27 | End: 2025-08-27 | Stop reason: HOSPADM

## 2025-08-27 RX ORDER — HEPARIN SODIUM 1000 [USP'U]/ML
INJECTION, SOLUTION INTRAVENOUS; SUBCUTANEOUS
Status: DISCONTINUED | OUTPATIENT
Start: 2025-08-27 | End: 2025-08-27 | Stop reason: SDUPTHER

## 2025-08-27 RX ORDER — SODIUM CHLORIDE, SODIUM LACTATE, POTASSIUM CHLORIDE, CALCIUM CHLORIDE 600; 310; 30; 20 MG/100ML; MG/100ML; MG/100ML; MG/100ML
INJECTION, SOLUTION INTRAVENOUS CONTINUOUS
Status: CANCELLED | OUTPATIENT
Start: 2025-08-27

## 2025-08-27 RX ADMIN — ROCURONIUM BROMIDE 40 MG: 10 INJECTION, SOLUTION INTRAVENOUS at 08:55

## 2025-08-27 RX ADMIN — PROPOFOL 200 MG: 10 INJECTION, EMULSION INTRAVENOUS at 08:55

## 2025-08-27 RX ADMIN — ONDANSETRON 4 MG: 2 INJECTION INTRAMUSCULAR; INTRAVENOUS at 10:11

## 2025-08-27 RX ADMIN — PHENYLEPHRINE HYDROCHLORIDE 200 MCG: 10 INJECTION INTRAVENOUS at 09:33

## 2025-08-27 RX ADMIN — PROTAMINE SULFATE 50 MG: 10 INJECTION, SOLUTION INTRAVENOUS at 10:14

## 2025-08-27 RX ADMIN — EPHEDRINE SULFATE 10 MG: 5 INJECTION INTRAVENOUS at 09:35

## 2025-08-27 RX ADMIN — LIDOCAINE HYDROCHLORIDE 100 MG: 20 INJECTION, SOLUTION EPIDURAL; INFILTRATION; INTRACAUDAL; PERINEURAL at 08:55

## 2025-08-27 RX ADMIN — SODIUM CHLORIDE, SODIUM LACTATE, POTASSIUM CHLORIDE, CALCIUM CHLORIDE: 600; 310; 30; 20 INJECTION, SOLUTION INTRAVENOUS at 08:45

## 2025-08-27 RX ADMIN — PROTAMINE SULFATE 50 MG: 10 INJECTION, SOLUTION INTRAVENOUS at 10:15

## 2025-08-27 RX ADMIN — MAGNESIUM SULFATE HEPTAHYDRATE 2 MG: 40 INJECTION, SOLUTION INTRAVENOUS at 10:00

## 2025-08-27 RX ADMIN — HEPARIN SODIUM 24000 UNITS: 1000 INJECTION, SOLUTION INTRAVENOUS; SUBCUTANEOUS at 09:13

## 2025-08-27 RX ADMIN — PHENYLEPHRINE HYDROCHLORIDE 200 MCG: 10 INJECTION INTRAVENOUS at 09:31

## 2025-08-27 RX ADMIN — HEPARIN SODIUM 40 UNITS/HR: 10000 INJECTION, SOLUTION INTRAVENOUS at 09:13

## 2025-08-27 ASSESSMENT — PAIN SCALES - GENERAL
PAINLEVEL_OUTOF10: 0
PAINLEVEL_OUTOF10: 0

## 2025-08-27 ASSESSMENT — PAIN - FUNCTIONAL ASSESSMENT: PAIN_FUNCTIONAL_ASSESSMENT: 0-10

## 2025-08-27 ASSESSMENT — LIFESTYLE VARIABLES: SMOKING_STATUS: 1

## 2025-09-02 ENCOUNTER — CARE COORDINATION (OUTPATIENT)
Dept: CARE COORDINATION | Facility: CLINIC | Age: 65
End: 2025-09-02

## (undated) DEVICE — CATHETER MAP F CRV 2-2-2-2-2 MM SPC PERSEID OCTARAY

## (undated) DEVICE — SYSTEM US 18GA NDL GUID KT PRB CVR LEN 96IN SITE RITE

## (undated) DEVICE — CATHETER REPROC EP F-J BIOBD710FJ282CTR

## (undated) DEVICE — SHEATH INTRO L10CM DIA8FR TIF TIP PINN

## (undated) DEVICE — ELECTRODE PT RET AD L9FT HI MOIST COND ADH HYDRGEL CORDED

## (undated) DEVICE — SHEATH INTRO SPEC 0.038 INX180 CM 8.5 FRX63 CM HEARTSPAN

## (undated) DEVICE — KIT TRNSDUC FLSH DEV MACRODRIP IV SET 48IN AND 12IN PRSS

## (undated) DEVICE — SHEATH INTRO 10FR L10CM DIL L2.5CM PERIPH W/ MINI S STL SPR

## (undated) DEVICE — NEEDLE SYR 18GA L1.5IN RED PLAS HUB S STL BLNT FILL W/O

## (undated) DEVICE — DEVICE VES SEAL OD 15 FR ID 10-12 FR PERC FEM VEN ACCS SITE

## (undated) DEVICE — SYRINGE, LUER SLIP, STERILE, 60ML: Brand: MEDLINE

## (undated) DEVICE — CANNULA NSL ORAL AD FOR CAPNOFLEX CO2 O2 AIRLFE

## (undated) DEVICE — KENDALL RADIOLUCENT FOAM MONITORING ELECTRODE RECTANGULAR SHAPE: Brand: KENDALL

## (undated) DEVICE — PATCH REF EXT FOR CARTO 3 SYS (EA = 6 PACKS)

## (undated) DEVICE — CATHETER ABLATION QDOT MICRO DF CURVE BIDIRECTIONAL THERMOCOUPLE

## (undated) DEVICE — SNARE POLYP SM W13MMXL240CM SHTH DIA2.4MM OVL FLX DISP

## (undated) DEVICE — SHEATH GUID 11.5X8.5FR L71MM M CRV L22MM BIDIR STEER CARTO

## (undated) DEVICE — TUBING PMP FOR CARTO SYS SMARTABLATE

## (undated) DEVICE — AIRLIFE™ OXYGEN TUBING 7 FEET (2.1 M) CRUSH RESISTANT OXYGEN TUBING, VINYL TIPPED: Brand: AIRLIFE™

## (undated) DEVICE — ENDOSCOPIC KIT 1.1+ OP4 CA DE 2 GWN AAMI LEVEL 3

## (undated) DEVICE — LUBE JELLY FOIL PACK 1.4 OZ: Brand: MEDLINE INDUSTRIES, INC.

## (undated) DEVICE — Device

## (undated) DEVICE — COVER US PRB L183CM DIA15.2-7.6CM CLR POLY TAPR ACCORDION

## (undated) DEVICE — NEEDLE TRANSSEPTAL L71CM FIX CRV C1 INSUL W/ RF ENERGY NRG

## (undated) DEVICE — SYRINGE MED 10ML LUERLOCK TIP W/O SFTY DISP

## (undated) DEVICE — TRAP SPEC POLYP REM STRNR CLN DSGN MAGNIFYING WIND DISP

## (undated) DEVICE — SYRINGE MEDICAL 3ML CLEAR PLASTIC STANDARD NON CONTROL LUERLOCK TIP DISPOSABLE

## (undated) DEVICE — CONNECTOR TBNG OD5-7MM O2 END DISP

## (undated) DEVICE — CATHETER REPROC ELCTRPHSLGY 10FR DIA 90CML DGNSTC ULTRSND F

## (undated) DEVICE — CONTAINER FORMALIN PREFILLED 10% NBF 60ML

## (undated) DEVICE — GAUZE,SPONGE,4"X4",12PLY,WOVEN,NS,LF: Brand: MEDLINE

## (undated) DEVICE — SYSTEM CLOSURE 6-12 FR VEN VASC VASCADE MVP

## (undated) DEVICE — SINGLE PORT MANIFOLD: Brand: NEPTUNE 2